# Patient Record
Sex: FEMALE | Race: WHITE | NOT HISPANIC OR LATINO | Employment: FULL TIME | URBAN - METROPOLITAN AREA
[De-identification: names, ages, dates, MRNs, and addresses within clinical notes are randomized per-mention and may not be internally consistent; named-entity substitution may affect disease eponyms.]

---

## 2023-04-10 PROBLEM — R07.9 CHEST PAIN: Status: ACTIVE | Noted: 2023-04-10

## 2023-04-10 PROBLEM — R51.9 NONINTRACTABLE HEADACHE: Status: ACTIVE | Noted: 2023-04-10

## 2023-04-10 PROBLEM — R10.10 UPPER ABDOMINAL PAIN: Status: ACTIVE | Noted: 2023-04-10

## 2023-04-10 PROBLEM — H53.2 DOUBLE VISION: Status: ACTIVE | Noted: 2023-04-10

## 2023-04-17 PROBLEM — F41.1 GAD (GENERALIZED ANXIETY DISORDER): Status: ACTIVE | Noted: 2023-04-17

## 2023-05-08 ENCOUNTER — HOSPITAL ENCOUNTER (OUTPATIENT)
Dept: NON INVASIVE DIAGNOSTICS | Facility: HOSPITAL | Age: 48
Discharge: HOME | End: 2023-05-08

## 2023-05-08 VITALS
HEIGHT: 62 IN | DIASTOLIC BLOOD PRESSURE: 70 MMHG | WEIGHT: 143 LBS | SYSTOLIC BLOOD PRESSURE: 110 MMHG | HEART RATE: 65 BPM | BODY MASS INDEX: 26.31 KG/M2

## 2023-05-08 DIAGNOSIS — R07.9 CHEST PAIN, UNSPECIFIED TYPE: ICD-10-CM

## 2023-05-08 LAB
AORTIC ROOT: 3 CM
APICAL FOUR CHAMBER EJECTION FRACTION: 63 %
ASCENDING AORTA: 2.8 CM
E WAVE DECELERATION TIME: 153 MS
FRACTIONAL SHORTENING: 40 (ref 28–44)
INTERVENTRICULAR SEPTUM IN DIASTOLE (PARASTERNAL SHORT AXIS VIEW): 0.8 CM
INTERVENTRICULAR SEPTUM: 0.8 CM (ref 0.6–1.1)
LAAS-AP2: 14.8 CM2
LAAS-AP4: 13 CM2
LEFT ATRIUM SIZE: 2.9 CM
LEFT INTERNAL DIMENSION IN SYSTOLE: 2.6 CM (ref 2.1–4)
LEFT VENTRICULAR INTERNAL DIMENSION IN DIASTOLE: 4.3 CM (ref 3.5–6)
LEFT VENTRICULAR POSTERIOR WALL IN END DIASTOLE: 0.9 CM
LEFT VENTRICULAR STROKE VOLUME: 56 ML
LVSV (TEICH): 56 ML
MV E'TISSUE VEL-LAT: 13 CM/S
MV PEAK A VEL: 0.63 M/S
MV PEAK E VEL: 89 CM/S
MV STENOSIS PRESSURE HALF TIME: 44 MS
MV VALVE AREA P 1/2 METHOD: 5
RA PRESSURE ESTIMATED: 3 MMHG
RIGHT ATRIUM AREA SYSTOLE A4C: 14.8 CM2
RIGHT VENTRICLE ID DIMENSION: 3 CM
RV PSP: 23 MMHG
SL CV LEFT ATRIUM LENGTH A2C: 5 CM
SL CV LV EF: 60
SL CV PED ECHO LEFT VENTRICLE DIASTOLIC VOLUME (MOD BIPLANE) 2D: 81 ML
SL CV PED ECHO LEFT VENTRICLE SYSTOLIC VOLUME (MOD BIPLANE) 2D: 25 ML
TR MAX PG: 20 MMHG
TR PEAK VELOCITY: 2.2 M/S
TRICUSPID ANNULAR PLANE SYSTOLIC EXCURSION: 3.1 CM
TRICUSPID VALVE PEAK REGURGITATION VELOCITY: 2.23 M/S

## 2024-05-06 ENCOUNTER — OFFICE VISIT (OUTPATIENT)
Dept: FAMILY MEDICINE CLINIC | Facility: CLINIC | Age: 49
End: 2024-05-06
Payer: COMMERCIAL

## 2024-05-06 VITALS
HEIGHT: 62 IN | TEMPERATURE: 98.2 F | WEIGHT: 133.6 LBS | OXYGEN SATURATION: 98 % | BODY MASS INDEX: 24.59 KG/M2 | RESPIRATION RATE: 16 BRPM | DIASTOLIC BLOOD PRESSURE: 60 MMHG | SYSTOLIC BLOOD PRESSURE: 116 MMHG | HEART RATE: 68 BPM

## 2024-05-06 DIAGNOSIS — Z00.00 ENCOUNTER FOR WELLNESS EXAMINATION IN ADULT: Primary | ICD-10-CM

## 2024-05-06 DIAGNOSIS — R51.9 NONINTRACTABLE HEADACHE, UNSPECIFIED CHRONICITY PATTERN, UNSPECIFIED HEADACHE TYPE: ICD-10-CM

## 2024-05-06 DIAGNOSIS — F41.1 GAD (GENERALIZED ANXIETY DISORDER): ICD-10-CM

## 2024-05-06 DIAGNOSIS — R53.83 OTHER FATIGUE: ICD-10-CM

## 2024-05-06 DIAGNOSIS — Z12.31 ENCOUNTER FOR SCREENING MAMMOGRAM FOR BREAST CANCER: ICD-10-CM

## 2024-05-06 DIAGNOSIS — Z12.11 COLON CANCER SCREENING: ICD-10-CM

## 2024-05-06 DIAGNOSIS — Z13.220 ENCOUNTER FOR SCREENING FOR LIPID DISORDER: ICD-10-CM

## 2024-05-06 PROCEDURE — 99213 OFFICE O/P EST LOW 20 MIN: CPT | Performed by: FAMILY MEDICINE

## 2024-05-06 PROCEDURE — 99396 PREV VISIT EST AGE 40-64: CPT | Performed by: FAMILY MEDICINE

## 2024-05-06 RX ORDER — ESCITALOPRAM OXALATE 10 MG/1
TABLET ORAL
Qty: 30 TABLET | Refills: 3 | Status: SHIPPED | OUTPATIENT
Start: 2024-05-06

## 2024-05-06 NOTE — PROGRESS NOTES
Name: Greg Mast      : 1975      MRN: [id number]  Encounter Provider: Franchesca Hernandez MD  Encounter Date: 2024   Encounter department: [facility], [place], operated by [facility]    Assessment & Plan     1. Encounter for wellness examination in adult  Comments:  Proceed with GYN exam, mammogram, Cologuard, routine blood work  Orders:  -     Ambulatory Referral to Obstetrics / Gynecology; Future    2. PERLITA (generalized anxiety disorder)  Assessment & Plan:  Symptoms of anxiety and depression.  Started 4 years ago after loss of her father.  Increase symptoms of anticipatory anxiety, irritability, feels overwhelmed easily.  We discussed treatment options.  I reinforced the importance of regular physical activity, meditation, life work balance.  Patient enjoys her job.  Very supportive family.  Start Lexapro 5 mg daily for 6 days then increase dose to 10 mg daily.  Mechanism of action and side effect discussed.  She will contact me with any questions or concerns    Orders:  -     escitalopram (LEXAPRO) 10 mg tablet; Take half a tab once a day for 6 days, then take 1 tablet daily    3. Nonintractable headache, unspecified chronicity pattern, unspecified headache type  Assessment & Plan:  Chronic HA   Prevuis neuroimaging was normal MRI- normal      4. Encounter for screening mammogram for breast cancer  -     Mammo screening bilateral w 3d & cad; Future; Expected date: 2024    5. Encounter for screening for lipid disorder  -     Lipid Panel with Direct LDL reflex; Future    6. Other fatigue  -     CBC and differential; Future  -     Comprehensive metabolic panel; Future  -     TSH, 3rd generation; Future    7. Colon cancer screening  -     Cologuard    Return in about 8 weeks (around 2024) for follow up.         Subjective     The patient presents for annual well exam.  She is here today accompanied by her .  [facility]  Presented with low-grade fever and  cough.  Treated with Zithromax and Depo-Medrol (did not take Rx).  Use Tessalon Perles for cough.  Generally feels better, just residual symptoms.  No shortness of breath, chest tightness or wheezing.      Patient presents for evaluation of depression and anxiety.  Symptoms started 4 years ago.  She feels anxious at all times.  Labile mood.  Easily irritable.  Overreacting often.  She feels that anxiety is the primary cause of sadness and depression.  Significant component of anticipatory anxiety.  Feels very emotional.  All symptoms started after loss of her father.    Menses have been irregular lately.  No hot flashes.  Past due GYN.       Active and healthy lifestyle.  Enjoys walking.    No recurrences of chest pain.      Review of Systems   Constitutional: Negative.    HENT: Negative.     Eyes: Negative.    Respiratory:  Positive for cough (residual).    Cardiovascular: Negative.    Gastrointestinal: Negative.    Endocrine: Negative.    Genitourinary: Negative.    Musculoskeletal: Negative.    Allergic/Immunologic: Negative.    Neurological: Negative.    Hematological: Negative.    Psychiatric/Behavioral:  Positive for dysphoric mood. Negative for sleep disturbance. The patient is nervous/anxious.         As per HPI       History reviewed. No pertinent past medical history.  History reviewed. No pertinent surgical history.  History reviewed. No pertinent family history.  Social History     Socioeconomic History   • Marital status: /Civil Union     Spouse name: None   • Number of children: None   • Years of education: None   • Highest education level: None   Occupational History   • None   Tobacco Use   • Smoking status: Never   • Smokeless tobacco: Never   Substance and Sexual Activity   • Alcohol use: Never   • Drug use: Never   • Sexual activity: Yes   Other Topics Concern   • None   Social History Narrative   • None     Social Determinants of Health     Financial Resource Strain: Not on file   Food  "Insecurity: Not on file   Transportation Needs: Not on file   Physical Activity: Not on file   Stress: Not on file   Social Connections: Not on file   Intimate Partner Violence: Not on file   Housing Stability: Not on file     Current Outpatient Medications on File Prior to Visit   Medication Sig   • pantoprazole (PROTONIX) 40 mg tablet Take 1 tablet (40 mg total) by mouth daily (Patient not taking: Reported on 5/6/2024)     Allergies   Allergen Reactions   • Fish Oil - Food Allergy Cough     Salt water fish       There is no immunization history on file for this patient.    Objective     /60 (BP Location: Left arm, Patient Position: Sitting, Cuff Size: Standard)   Pulse 68   Temp 98.2 °F (36.8 °C) (Temporal)   Resp 16   Ht 5' 2\" (1.575 m)   Wt 60.6 kg (133 lb 9.6 oz)   SpO2 98%   BMI 24.44 kg/m²     Physical Exam  Vitals and nursing note reviewed.   Constitutional:       General: She is not in acute distress.     Appearance: Normal appearance. She is well-developed. She is not ill-appearing.   HENT:      Head: Normocephalic and atraumatic.   Eyes:      General: No scleral icterus.     Conjunctiva/sclera: Conjunctivae normal.   Neck:      Thyroid: No thyromegaly.      Vascular: No carotid bruit.   Cardiovascular:      Rate and Rhythm: Normal rate and regular rhythm.      Heart sounds: Normal heart sounds. No murmur heard.  Pulmonary:      Effort: Pulmonary effort is normal. No respiratory distress.      Breath sounds: Normal breath sounds. No wheezing.   Abdominal:      General: Abdomen is flat. Bowel sounds are normal. There is no abdominal bruit.      Palpations: Abdomen is soft.      Hernia: No hernia is present.   Musculoskeletal:         General: Normal range of motion.      Cervical back: Neck supple. No rigidity.      Right lower leg: No edema.      Left lower leg: No edema.   Neurological:      General: No focal deficit present.      Mental Status: She is alert and oriented to person, place, and " time.      Cranial Nerves: No cranial nerve deficit.      Coordination: Coordination normal.   Psychiatric:         Mood and Affect: Mood normal.         Behavior: Behavior normal.         Thought Content: Thought content normal.       Franchesca Hernandez MD

## 2024-05-07 ENCOUNTER — LAB (OUTPATIENT)
Dept: LAB | Facility: CLINIC | Age: 49
End: 2024-05-07
Payer: COMMERCIAL

## 2024-05-07 DIAGNOSIS — R53.83 OTHER FATIGUE: ICD-10-CM

## 2024-05-07 DIAGNOSIS — Z13.220 ENCOUNTER FOR SCREENING FOR LIPID DISORDER: ICD-10-CM

## 2024-05-07 LAB
ALBUMIN SERPL BCP-MCNC: 4.2 G/DL (ref 3.5–5)
ALP SERPL-CCNC: 48 U/L (ref 34–104)
ALT SERPL W P-5'-P-CCNC: 6 U/L (ref 7–52)
ANION GAP SERPL CALCULATED.3IONS-SCNC: 4 MMOL/L (ref 4–13)
AST SERPL W P-5'-P-CCNC: 9 U/L (ref 13–39)
BASOPHILS # BLD AUTO: 0.04 THOUSANDS/ÂΜL (ref 0–0.1)
BASOPHILS NFR BLD AUTO: 1 % (ref 0–1)
BILIRUB SERPL-MCNC: 0.28 MG/DL (ref 0.2–1)
BUN SERPL-MCNC: 14 MG/DL (ref 5–25)
CALCIUM SERPL-MCNC: 9.1 MG/DL (ref 8.4–10.2)
CHLORIDE SERPL-SCNC: 102 MMOL/L (ref 96–108)
CHOLEST SERPL-MCNC: 174 MG/DL
CO2 SERPL-SCNC: 29 MMOL/L (ref 21–32)
CREAT SERPL-MCNC: 0.71 MG/DL (ref 0.6–1.3)
EOSINOPHIL # BLD AUTO: 0.11 THOUSAND/ÂΜL (ref 0–0.61)
EOSINOPHIL NFR BLD AUTO: 1 % (ref 0–6)
ERYTHROCYTE [DISTWIDTH] IN BLOOD BY AUTOMATED COUNT: 13.1 % (ref 11.6–15.1)
GFR SERPL CREATININE-BSD FRML MDRD: 101 ML/MIN/1.73SQ M
GLUCOSE P FAST SERPL-MCNC: 94 MG/DL (ref 65–99)
HCT VFR BLD AUTO: 37.8 % (ref 34.8–46.1)
HDLC SERPL-MCNC: 61 MG/DL
HGB BLD-MCNC: 12.2 G/DL (ref 11.5–15.4)
IMM GRANULOCYTES # BLD AUTO: 0.02 THOUSAND/UL (ref 0–0.2)
IMM GRANULOCYTES NFR BLD AUTO: 0 % (ref 0–2)
LDLC SERPL CALC-MCNC: 100 MG/DL (ref 0–100)
LYMPHOCYTES # BLD AUTO: 2.04 THOUSANDS/ÂΜL (ref 0.6–4.47)
LYMPHOCYTES NFR BLD AUTO: 24 % (ref 14–44)
MCH RBC QN AUTO: 30.2 PG (ref 26.8–34.3)
MCHC RBC AUTO-ENTMCNC: 32.3 G/DL (ref 31.4–37.4)
MCV RBC AUTO: 94 FL (ref 82–98)
MONOCYTES # BLD AUTO: 0.5 THOUSAND/ÂΜL (ref 0.17–1.22)
MONOCYTES NFR BLD AUTO: 6 % (ref 4–12)
NEUTROPHILS # BLD AUTO: 5.77 THOUSANDS/ÂΜL (ref 1.85–7.62)
NEUTS SEG NFR BLD AUTO: 68 % (ref 43–75)
NRBC BLD AUTO-RTO: 0 /100 WBCS
PLATELET # BLD AUTO: 305 THOUSANDS/UL (ref 149–390)
PMV BLD AUTO: 10 FL (ref 8.9–12.7)
POTASSIUM SERPL-SCNC: 4.2 MMOL/L (ref 3.5–5.3)
PROT SERPL-MCNC: 7.1 G/DL (ref 6.4–8.4)
RBC # BLD AUTO: 4.04 MILLION/UL (ref 3.81–5.12)
SODIUM SERPL-SCNC: 135 MMOL/L (ref 135–147)
TRIGL SERPL-MCNC: 66 MG/DL
TSH SERPL DL<=0.05 MIU/L-ACNC: 1.3 UIU/ML (ref 0.45–4.5)
WBC # BLD AUTO: 8.48 THOUSAND/UL (ref 4.31–10.16)

## 2024-05-07 PROCEDURE — 80061 LIPID PANEL: CPT

## 2024-05-07 PROCEDURE — 84443 ASSAY THYROID STIM HORMONE: CPT

## 2024-05-07 PROCEDURE — 36415 COLL VENOUS BLD VENIPUNCTURE: CPT

## 2024-05-07 PROCEDURE — 85025 COMPLETE CBC W/AUTO DIFF WBC: CPT

## 2024-05-07 PROCEDURE — 80053 COMPREHEN METABOLIC PANEL: CPT

## 2024-05-08 ENCOUNTER — PATIENT MESSAGE (OUTPATIENT)
Dept: FAMILY MEDICINE CLINIC | Facility: CLINIC | Age: 49
End: 2024-05-08

## 2024-05-08 DIAGNOSIS — R79.89 ABNORMAL LFTS: Primary | ICD-10-CM

## 2024-05-09 NOTE — ASSESSMENT & PLAN NOTE
Symptoms of anxiety and depression.  Started 4 years ago after loss of her father.  Increase symptoms of anticipatory anxiety, irritability, feels overwhelmed easily.  We discussed treatment options.  I reinforced the importance of regular physical activity, meditation, life work balance.  Patient enjoys her job.  Very supportive family.  Start Lexapro 5 mg daily for 6 days then increase dose to 10 mg daily.  Mechanism of action and side effect discussed.  She will contact me with any questions or concerns

## 2024-05-27 DIAGNOSIS — F41.1 GAD (GENERALIZED ANXIETY DISORDER): ICD-10-CM

## 2024-05-28 RX ORDER — ESCITALOPRAM OXALATE 10 MG/1
TABLET ORAL
Qty: 30 TABLET | Refills: 0 | Status: SHIPPED | OUTPATIENT
Start: 2024-05-28

## 2024-06-25 DIAGNOSIS — F41.1 GAD (GENERALIZED ANXIETY DISORDER): ICD-10-CM

## 2024-06-26 RX ORDER — ESCITALOPRAM OXALATE 10 MG/1
TABLET ORAL
Qty: 30 TABLET | Refills: 5 | Status: SHIPPED | OUTPATIENT
Start: 2024-06-26

## 2024-07-05 ENCOUNTER — OFFICE VISIT (OUTPATIENT)
Dept: FAMILY MEDICINE CLINIC | Facility: CLINIC | Age: 49
End: 2024-07-05
Payer: COMMERCIAL

## 2024-07-05 VITALS
RESPIRATION RATE: 16 BRPM | TEMPERATURE: 97.3 F | BODY MASS INDEX: 24.66 KG/M2 | HEIGHT: 62 IN | SYSTOLIC BLOOD PRESSURE: 106 MMHG | OXYGEN SATURATION: 99 % | WEIGHT: 134 LBS | DIASTOLIC BLOOD PRESSURE: 70 MMHG | HEART RATE: 66 BPM

## 2024-07-05 DIAGNOSIS — M79.641 PAIN OF RIGHT HAND: ICD-10-CM

## 2024-07-05 DIAGNOSIS — F41.1 GAD (GENERALIZED ANXIETY DISORDER): ICD-10-CM

## 2024-07-05 DIAGNOSIS — M79.601 PARESTHESIA AND PAIN OF BOTH UPPER EXTREMITIES: Primary | ICD-10-CM

## 2024-07-05 DIAGNOSIS — M79.602 PARESTHESIA AND PAIN OF BOTH UPPER EXTREMITIES: Primary | ICD-10-CM

## 2024-07-05 DIAGNOSIS — R20.2 PARESTHESIA AND PAIN OF BOTH UPPER EXTREMITIES: Primary | ICD-10-CM

## 2024-07-05 PROCEDURE — 99213 OFFICE O/P EST LOW 20 MIN: CPT | Performed by: FAMILY MEDICINE

## 2024-07-05 NOTE — PROGRESS NOTES
Ambulatory Visit  Name: Greg Mast      : 1975      MRN: [id number]  Encounter Provider: Franchesca Hernandez MD  Encounter Date: 2024   Encounter department: [facility]    Assessment & Plan   1. Paresthesia and pain of both upper extremities  Assessment & Plan:  I suspect entrapment, rule out carpal tunnel syndrome, rule out ulnar neuropathy.  Proceed with EMG.  Start using soft wrist splint on a regular basis.  We will consider referral to hand surgery based on EMG results.  Orders:  -     EMG 2 Limb Upper Extremity; Future  2. PERLITA (generalized anxiety disorder)  Assessment & Plan:  Symptoms have significantly improved.  Continue Lexapro 10 mg daily  3. Pain of right hand  -     XR hand 3+ vw right; Future; Expected date: 2024         History of Present Illness     Patient presents for evaluation of pain and numbness right hand and forearm.  Symptoms started a while ago, much more bothersome over the past few months.  She frequently wakes up in the middle of the night due to right hand and forearm pain and numbness.  She denies neck pain that radiates down to the right arm.  Patient is a former professional .  No recent repetitive activities.  Patient feels that she is unable to make a fist with her right hand.  She is also concerned about the deformity of right fifth digit.  Patient recalls trying soft wrist splint for short period of time and it has helped.  She has not been wearing it lately    Symptoms of anxiety have significantly improved on Lexapro 10 mg daily.  No side effects.  Patient would like to continue Rx.      Review of Systems   Constitutional: Negative.    Respiratory: Negative.     Cardiovascular: Negative.    Musculoskeletal:  Positive for arthralgias.   Neurological:  Positive for numbness.     Past Medical History:   Diagnosis Date   • Headache(784.0)    • Visual impairment      History reviewed. No pertinent surgical history.  Family  "History   Problem Relation Age of Onset   • Diabetes Father      Social History     Tobacco Use   • Smoking status: Former     Current packs/day: 0.25     Types: Cigarettes   • Smokeless tobacco: Never   Vaping Use   • Vaping status: Never Used   Substance and Sexual Activity   • Alcohol use: Never   • Drug use: Never   • Sexual activity: Yes     Current Outpatient Medications on File Prior to Visit   Medication Sig   • escitalopram (LEXAPRO) 10 mg tablet Take half a tab once a day for 6 days, then take 1 tablet daily   • pantoprazole (PROTONIX) 40 mg tablet Take 1 tablet (40 mg total) by mouth daily     Allergies   Allergen Reactions   • Fish Oil - Food Allergy Cough     Salt water fish       There is no immunization history on file for this patient.  Objective     /70 (BP Location: Left arm, Patient Position: Sitting, Cuff Size: Standard)   Pulse 66   Temp (!) 97.3 °F (36.3 °C) (Temporal)   Resp 16   Ht 5' 2\" (1.575 m)   Wt 60.8 kg (134 lb)   SpO2 99%   BMI 24.51 kg/m²     Physical Exam  Vitals and nursing note reviewed.   Constitutional:       Appearance: Normal appearance.   Musculoskeletal:      Comments: Patient is unable to make a full fist with her right hand.  PIP arthritic deformity right fifth digit.  Muscle wasting of right thenar   Neurological:      General: No focal deficit present.      Mental Status: She is alert and oriented to person, place, and time.   Psychiatric:         Mood and Affect: Mood normal.         Behavior: Behavior normal.         Thought Content: Thought content normal.       Administrative Statements         "

## 2024-07-07 PROBLEM — M79.602 PARESTHESIA AND PAIN OF BOTH UPPER EXTREMITIES: Status: ACTIVE | Noted: 2024-07-07

## 2024-07-07 PROBLEM — R20.2 PARESTHESIA AND PAIN OF BOTH UPPER EXTREMITIES: Status: ACTIVE | Noted: 2024-07-07

## 2024-07-07 PROBLEM — M79.601 PARESTHESIA AND PAIN OF BOTH UPPER EXTREMITIES: Status: ACTIVE | Noted: 2024-07-07

## 2024-07-07 NOTE — ASSESSMENT & PLAN NOTE
I suspect entrapment, rule out carpal tunnel syndrome, rule out ulnar neuropathy.  Proceed with EMG.  Start using soft wrist splint on a regular basis.  We will consider referral to hand surgery based on EMG results.

## 2024-07-15 ENCOUNTER — OFFICE VISIT (OUTPATIENT)
Dept: OBGYN CLINIC | Facility: CLINIC | Age: 49
End: 2024-07-15
Payer: COMMERCIAL

## 2024-07-15 VITALS
WEIGHT: 135 LBS | BODY MASS INDEX: 24.84 KG/M2 | DIASTOLIC BLOOD PRESSURE: 80 MMHG | HEIGHT: 62 IN | SYSTOLIC BLOOD PRESSURE: 114 MMHG

## 2024-07-15 DIAGNOSIS — Z01.419 ENCOUNTER FOR ANNUAL ROUTINE GYNECOLOGICAL EXAMINATION: Primary | ICD-10-CM

## 2024-07-15 DIAGNOSIS — Z90.721 H/O UNILATERAL OOPHORECTOMY: ICD-10-CM

## 2024-07-15 DIAGNOSIS — N89.8 VAGINAL ITCHING: ICD-10-CM

## 2024-07-15 DIAGNOSIS — Z00.00 ENCOUNTER FOR WELLNESS EXAMINATION IN ADULT: ICD-10-CM

## 2024-07-15 DIAGNOSIS — D21.9 FIBROID: ICD-10-CM

## 2024-07-15 DIAGNOSIS — Z11.3 SCREENING EXAMINATION FOR STD (SEXUALLY TRANSMITTED DISEASE): ICD-10-CM

## 2024-07-15 PROCEDURE — 87070 CULTURE OTHR SPECIMN AEROBIC: CPT | Performed by: OBSTETRICS & GYNECOLOGY

## 2024-07-15 PROCEDURE — 87591 N.GONORRHOEAE DNA AMP PROB: CPT | Performed by: OBSTETRICS & GYNECOLOGY

## 2024-07-15 PROCEDURE — 87491 CHLMYD TRACH DNA AMP PROBE: CPT | Performed by: OBSTETRICS & GYNECOLOGY

## 2024-07-15 PROCEDURE — G0124 SCREEN C/V THIN LAYER BY MD: HCPCS | Performed by: PATHOLOGY

## 2024-07-15 PROCEDURE — 99386 PREV VISIT NEW AGE 40-64: CPT | Performed by: OBSTETRICS & GYNECOLOGY

## 2024-07-15 PROCEDURE — G0145 SCR C/V CYTO,THINLAYER,RESCR: HCPCS | Performed by: PATHOLOGY

## 2024-07-15 PROCEDURE — G0476 HPV COMBO ASSAY CA SCREEN: HCPCS | Performed by: OBSTETRICS & GYNECOLOGY

## 2024-07-15 PROCEDURE — 81514 NFCT DS BV&VAGINITIS DNA ALG: CPT | Performed by: OBSTETRICS & GYNECOLOGY

## 2024-07-15 RX ORDER — BENZONATATE 100 MG/1
CAPSULE ORAL
COMMUNITY
Start: 2024-04-29

## 2024-07-15 RX ORDER — METHYLPREDNISOLONE 4 MG/1
TABLET ORAL
COMMUNITY
Start: 2024-04-29

## 2024-07-15 NOTE — PROGRESS NOTES
Subjective      Greg Mast is a 49 y.o. female who presents for annual GYN exam.     GYN:  Menses are regular, q 20-25 days (her periods have mostly been 23-26 days for the past 6 months with the exception of March-April which was a 20 day window), lasting 7-10 days. She denies intermenstrual bleeding, or spotting. Just now 7 days with 3-4 days very heavy; then tapers  She denies vaginal discharge, labial erythema or lesions, dyspareunia. She reports vaginal itching for which she tried clomotrizole cream x3 days. She denies any increased discharge or odor.   Contraception: None  Patient is sexually active with her . No problems.   She reports that she was diagnosed with small fibroids in the past but has not had any problems (other than occasional candida). It has been 7-8 years since she was last seen by a GYN. She [place] in [place]  but was unable to obtain health care due to immigration status. She reports a history of oophorectomy while she was asleep?     OB:  OB History    Para Term  AB Living   2 2 2     2   SAB IAB Ectopic Multiple Live Births           2      # Outcome Date GA Lbr Kaleb/2nd Weight Sex Type Anes PTL Lv   2 Term     M CS-Unspec   JOSIAH   1 Term     M CS-Unspec   JOSIAH     :  She denies dysuria, urinary frequency or urgency.  She denies hematuria, flank pain, incontinence.    Breast:  She denies breast mass, skin changes, dimpling, reddening, nipple retraction.  She denies breast discharge.  Patient does not have a family history of breast, endometrial, colon, or ovarian ca.    Past Medical History:   Diagnosis Date    Headache(784.0)     Visual impairment        History reviewed. No pertinent surgical history.      General:  Diet: Good  Exercise: Yes  Safety: , son, mom and 2 dogs; yes    Social History     Tobacco Use    Smoking status: Former     Current packs/day: 0.25     Types: Cigarettes    Smokeless tobacco: Never   Vaping Use  "   Vaping status: Never Used   Substance Use Topics    Alcohol use: Never    Drug use: Never       Screening:  Cervical cancer: She states that she has never had a pap test done.     Breast cancer: Not yet completed  Colon cancer: Not yet completed  STD screening: None.    Review of Systems   Constitutional:  Negative for appetite change, chills and fever.   HENT:  Negative for trouble swallowing.    Respiratory:  Negative for shortness of breath.    Cardiovascular:  Negative for chest pain.   Gastrointestinal:  Negative for abdominal pain, constipation, diarrhea, nausea and vomiting.   Genitourinary:  Negative for decreased urine volume, difficulty urinating, dyspareunia, dysuria, flank pain, frequency, genital sores, hematuria, menstrual problem, pelvic pain, urgency, vaginal bleeding, vaginal discharge and vaginal pain.          Objective      /80 (BP Location: Left arm, Patient Position: Sitting, Cuff Size: Standard)   Ht 5' 2\" (1.575 m)   Wt 61.2 kg (135 lb)   LMP 07/08/2024 (Exact Date)   BMI 24.69 kg/m²   Physical Exam  Vitals reviewed.   Constitutional:       General: She is not in acute distress.     Appearance: Normal appearance. She is well-developed. She is not ill-appearing, toxic-appearing or diaphoretic.   Neck:      Thyroid: No thyroid mass, thyromegaly or thyroid tenderness.   Cardiovascular:      Rate and Rhythm: Normal rate and regular rhythm.      Heart sounds: Normal heart sounds. No murmur heard.     No friction rub. No gallop.   Pulmonary:      Effort: Pulmonary effort is normal. No respiratory distress.      Breath sounds: Normal breath sounds. No stridor. No wheezing, rhonchi or rales.   Chest:      Chest wall: No tenderness.   Breasts:     Breasts are symmetrical.      Right: No swelling, bleeding, inverted nipple, mass, nipple discharge, skin change or tenderness.      Left: No swelling, bleeding, inverted nipple, mass, nipple discharge, skin change or tenderness.   Abdominal:    "   General: There is no distension.      Palpations: Abdomen is soft.      Tenderness: There is no abdominal tenderness.   Genitourinary:     General: Normal vulva.      Exam position: Lithotomy position.      Labia:         Right: No rash, tenderness, lesion or injury.         Left: No rash, tenderness, lesion or injury.       Urethra: No prolapse or urethral lesion.      Vagina: Normal. No signs of injury and foreign body. No vaginal discharge, erythema, tenderness, bleeding, lesions or prolapsed vaginal walls.      Cervix: No cervical motion tenderness, discharge, friability, lesion, erythema, cervical bleeding or eversion.      Uterus: Not deviated, not enlarged, not fixed, not tender and no uterine prolapse.       Adnexa:         Right: No mass, tenderness or fullness.          Left: No mass, tenderness or fullness.        Rectum: No external hemorrhoid.   Lymphadenopathy:      Cervical: No cervical adenopathy.      Upper Body:      Right upper body: No supraclavicular, axillary or pectoral adenopathy.      Left upper body: No supraclavicular, axillary or pectoral adenopathy.   Skin:     General: Skin is warm and dry.   Neurological:      Mental Status: She is alert and oriented to person, place, and time.   Psychiatric:         Behavior: Behavior normal. Behavior is cooperative.                 Assessment/Plan  Problem List Items Addressed This Visit          Unprioritized    Encounter for annual routine gynecological examination - Primary     GYN concerns today: itching, lack of care  Birth control: Declined  Cervical cancer screening: Collected today  Breast Cancer screening: Scheduled for 9/20/24  Colon cancer Screening: Cologuard ordered by PCP; Patient encouraged to complete  STD screening: Vaginal cultures collected due to itching  Itching: Cultures collected  Hx fibroids/ Hx oophorectomy: US ordered to further assess anatomy (normal on exam)  Reviewed healthy lifestyle and safe sex practices  RTO for  annual exam or PRN         Relevant Orders    US pelvis complete w transvaginal    Liquid-based pap, screening    Vaginal itching    Relevant Orders    Genital Comprehensive Culture    Molecular Vaginal Panel    Chlamydia/GC amplified DNA by PCR    Trichomonas Vaginalis, BRI     Other Visit Diagnoses       Encounter for wellness examination in adult        Proceed with GYN exam, mammogram, Cologuard, routine blood work    Screening examination for STD (sexually transmitted disease)        Relevant Orders    Chlamydia/GC amplified DNA by PCR    Trichomonas Vaginalis, BRI    Fibroid        H/O unilateral oophorectomy        Relevant Orders    US pelvis complete w transvaginal              Itzel Soto MD  OB/GYN  7/15/2024  5:36 PM

## 2024-07-15 NOTE — ASSESSMENT & PLAN NOTE
GYN concerns today: itching, lack of care  Birth control: Declined  Cervical cancer screening: Collected today  Breast Cancer screening: Scheduled for 9/20/24  Colon cancer Screening: Cologuard ordered by PCP; Patient encouraged to complete  STD screening: Vaginal cultures collected due to itching  Itching: Cultures collected  Hx fibroids/ Hx oophorectomy: US ordered to further assess anatomy (normal on exam)  Reviewed healthy lifestyle and safe sex practices  RTO for annual exam or PRN

## 2024-07-16 LAB
C GLABRATA DNA VAG QL NAA+PROBE: NEGATIVE
C KRUSEI DNA VAG QL NAA+PROBE: NEGATIVE
CANDIDA SP 6 PNL VAG NAA+PROBE: NEGATIVE
HPV HR 12 DNA CVX QL NAA+PROBE: NEGATIVE
HPV16 DNA CVX QL NAA+PROBE: NEGATIVE
HPV18 DNA CVX QL NAA+PROBE: NEGATIVE
T VAGINALIS DNA VAG QL NAA+PROBE: NEGATIVE
VAGINOSIS/ITIS DNA PNL VAG PROBE+SIG AMP: NEGATIVE

## 2024-07-17 LAB
C TRACH DNA SPEC QL NAA+PROBE: NEGATIVE
N GONORRHOEA DNA SPEC QL NAA+PROBE: NEGATIVE

## 2024-07-19 LAB
BACTERIA GENITAL AEROBE CULT: NORMAL
LAB AP GYN PRIMARY INTERPRETATION: NORMAL
Lab: NORMAL
PATH INTERP SPEC-IMP: NORMAL

## 2024-07-27 DIAGNOSIS — F41.1 GAD (GENERALIZED ANXIETY DISORDER): ICD-10-CM

## 2024-07-28 RX ORDER — ESCITALOPRAM OXALATE 10 MG/1
TABLET ORAL
Qty: 30 TABLET | Refills: 0 | Status: SHIPPED | OUTPATIENT
Start: 2024-07-28

## 2024-08-09 DIAGNOSIS — M54.12 CERVICAL RADICULOPATHY AT C7: Primary | ICD-10-CM

## 2024-08-09 NOTE — ASSESSMENT & PLAN NOTE
EMG July 29, 2024 at [facility]  Chronic bilateral C7 cervical radiculopathy without active denervation.  There is otherwise no electrodiagnostic evidence of brachial plexopathy, myopathy, peripheral polyneuropathy, mononeuropathy of the median, ulnar, or radial nerves in bilateral upper limbs.

## 2024-08-14 PROBLEM — Z01.419 ENCOUNTER FOR ANNUAL ROUTINE GYNECOLOGICAL EXAMINATION: Status: RESOLVED | Noted: 2024-07-15 | Resolved: 2024-08-14

## 2024-08-25 DIAGNOSIS — F41.1 GAD (GENERALIZED ANXIETY DISORDER): ICD-10-CM

## 2024-08-25 RX ORDER — ESCITALOPRAM OXALATE 10 MG/1
TABLET ORAL
Qty: 30 TABLET | Refills: 5 | Status: SHIPPED | OUTPATIENT
Start: 2024-08-25

## 2024-09-20 ENCOUNTER — HOSPITAL ENCOUNTER (OUTPATIENT)
Dept: MAMMOGRAPHY | Facility: HOSPITAL | Age: 49
Discharge: HOME | End: 2024-09-20
Payer: COMMERCIAL

## 2024-09-20 VITALS — WEIGHT: 135 LBS | BODY MASS INDEX: 24.84 KG/M2 | HEIGHT: 62 IN

## 2024-09-20 DIAGNOSIS — Z12.31 ENCOUNTER FOR SCREENING MAMMOGRAM FOR BREAST CANCER: ICD-10-CM

## 2024-09-20 PROCEDURE — 77063 BREAST TOMOSYNTHESIS BI: CPT

## 2024-09-20 PROCEDURE — 77067 SCR MAMMO BI INCL CAD: CPT

## 2024-10-03 DIAGNOSIS — F41.1 GAD (GENERALIZED ANXIETY DISORDER): ICD-10-CM

## 2024-10-03 RX ORDER — ESCITALOPRAM OXALATE 10 MG/1
TABLET ORAL
Qty: 30 TABLET | Refills: 5 | Status: SHIPPED | OUTPATIENT
Start: 2024-10-03

## 2024-10-25 ENCOUNTER — IMMUNIZATIONS (OUTPATIENT)
Dept: FAMILY MEDICINE CLINIC | Facility: CLINIC | Age: 49
End: 2024-10-25
Payer: COMMERCIAL

## 2024-10-25 DIAGNOSIS — Z23 ENCOUNTER FOR IMMUNIZATION: Primary | ICD-10-CM

## 2024-10-25 PROCEDURE — 90656 IIV3 VACC NO PRSV 0.5 ML IM: CPT

## 2024-10-25 PROCEDURE — 90471 IMMUNIZATION ADMIN: CPT

## 2024-11-04 DIAGNOSIS — F41.1 GAD (GENERALIZED ANXIETY DISORDER): ICD-10-CM

## 2024-11-05 RX ORDER — ESCITALOPRAM OXALATE 10 MG/1
TABLET ORAL
Qty: 30 TABLET | Refills: 5 | Status: SHIPPED | OUTPATIENT
Start: 2024-11-05

## 2024-11-11 ENCOUNTER — TELEPHONE (OUTPATIENT)
Age: 49
End: 2024-11-11

## 2024-11-11 DIAGNOSIS — N93.9 ABNORMAL UTERINE BLEEDING (AUB): Primary | ICD-10-CM

## 2024-11-11 RX ORDER — TRANEXAMIC ACID 650 MG/1
1300 TABLET ORAL 3 TIMES DAILY
Qty: 30 TABLET | Refills: 3 | Status: SHIPPED | OUTPATIENT
Start: 2024-11-11 | End: 2024-11-16

## 2024-11-11 NOTE — TELEPHONE ENCOUNTER
Pt called to schedule OVS w/ Dr. Soto for problem visit due to bleeding concerns. Please see MYC messages. There is no appt availability in reasonable time frame. Please follow-up with pt for scheduling.

## 2024-11-15 ENCOUNTER — OFFICE VISIT (OUTPATIENT)
Dept: OBGYN CLINIC | Facility: CLINIC | Age: 49
End: 2024-11-15
Payer: COMMERCIAL

## 2024-11-15 VITALS
HEIGHT: 62 IN | DIASTOLIC BLOOD PRESSURE: 72 MMHG | BODY MASS INDEX: 26.5 KG/M2 | SYSTOLIC BLOOD PRESSURE: 118 MMHG | WEIGHT: 144 LBS

## 2024-11-15 DIAGNOSIS — N93.9 ABNORMAL UTERINE BLEEDING (AUB): Primary | ICD-10-CM

## 2024-11-15 PROCEDURE — 99213 OFFICE O/P EST LOW 20 MIN: CPT | Performed by: OBSTETRICS & GYNECOLOGY

## 2024-11-15 RX ORDER — TOBRAMYCIN 3 MG/ML
SOLUTION/ DROPS OPHTHALMIC
COMMUNITY
Start: 2024-11-11

## 2024-11-15 NOTE — TELEPHONE ENCOUNTER
Patient was instructed by Dr. Soto to complete ultrasound and blood work prior to a visit. Patient is welcomed to come in for her appointment today but will need to complete labs and ultrasound to determine the best method of treatment. Orders were placed for patient. Called patient and LM requesting a call back in regards to her visit today.

## 2024-11-15 NOTE — PROGRESS NOTES
"Subjective:     Greg Mast is a 49 y.o.  female who presents for an urgent appointment to discuss AUB. She reports having bleeding every other week that lasts for 7 days. The first 3-4 days days are very heavy. She denies pain but reports stringy clots (like liver). She called on 24 and was encouraged to complete her previously ordered US and lab work was also ordered (but not yet completed). She was given a prescription for TXA which she states started to help the second day after she started taking it. At her APE in 2024, she reported shorted cycles (mostly 23-26 days) lasting 7-10 days without intermenstrual bleeding.     Patient Active Problem List   Diagnosis    Upper abdominal pain    Double vision    Nonintractable headache    Chest pain    PERLITA (generalized anxiety disorder)    Abnormal LFTs    Cervical radiculopathy at C7    Vaginal itching     Past Medical History:   Diagnosis Date    Headache(784.0)     Visual impairment      History reviewed. No pertinent surgical history.    Objective:    Vitals: Blood pressure 118/72, height 5' 2\" (1.575 m), weight 65.3 kg (144 lb), last menstrual period 2024.Body mass index is 26.34 kg/m².    Physical Exam  Vitals reviewed.   Constitutional:       General: She is not in acute distress.     Appearance: Normal appearance. She is well-developed. She is not ill-appearing, toxic-appearing or diaphoretic.   Cardiovascular:      Rate and Rhythm: Normal rate.   Pulmonary:      Effort: Pulmonary effort is normal. No respiratory distress.   Genitourinary:     General: Normal vulva.      Exam position: Lithotomy position.      Labia:         Right: No rash, tenderness, lesion or injury.         Left: No rash, tenderness, lesion or injury.       Vagina: No signs of injury and foreign body. No vaginal discharge, erythema, tenderness, bleeding, lesions or prolapsed vaginal walls.      Cervix: Discharge present. No cervical motion tenderness, friability, " lesion, erythema, cervical bleeding or eversion.      Comments: Small stringly orange, old blood   Skin:     General: Skin is warm and dry.   Neurological:      Mental Status: She is alert and oriented to person, place, and time.   Psychiatric:         Mood and Affect: Mood normal.         Behavior: Behavior normal.         Assessment/Plan:    Assessment & Plan  Abnormal uterine bleeding (AUB)  We reviewed the AUB workup and treatment options:  Workup:   - Exam: Unremarkable; Likely small fibroid uterus given palpation  - Lab work: Ordered but not yet completed; patient states that she will complete  - Pap: NILM/ HPV negative 7/2024  - EMB: Offered; patient wishes to wait until after her US results  - US: Ordered again today but this time STAT so that it can be completed in a timely fashion  Treatment options: After completion of workup, will discuss treatment options further. Patient is aware of this plan and the reasons for it. She will continue TXA for now if her bleeding is heavy. If she has persistently frequent episodes of bleeding while awaiting follow up appointment or test results, can consider Aygestin taper to stop bleeding while we wait. Otherwise, medical and surgical treatment options will be discussed as applicable after her testing is completed. All questions were answered to patient's satisfaction.     Orders:    US pelvis complete w transvaginal; Future      Itzel Talbot MD  OB/GYN  She/her  11/15/2024  3:39 PM

## 2024-11-19 ENCOUNTER — HOSPITAL ENCOUNTER (OUTPATIENT)
Dept: ULTRASOUND IMAGING | Facility: CLINIC | Age: 49
Discharge: HOME | End: 2024-11-19
Payer: COMMERCIAL

## 2024-11-19 ENCOUNTER — HOSPITAL ENCOUNTER (OUTPATIENT)
Dept: MAMMOGRAPHY | Facility: CLINIC | Age: 49
Discharge: HOME | End: 2024-11-19
Payer: COMMERCIAL

## 2024-11-19 VITALS — HEIGHT: 62 IN | BODY MASS INDEX: 26.5 KG/M2 | WEIGHT: 144 LBS

## 2024-11-19 DIAGNOSIS — R92.8 ABNORMAL SCREENING MAMMOGRAM: ICD-10-CM

## 2024-11-19 PROCEDURE — 76642 ULTRASOUND BREAST LIMITED: CPT

## 2024-11-19 PROCEDURE — G0279 TOMOSYNTHESIS, MAMMO: HCPCS

## 2024-11-19 PROCEDURE — 77065 DX MAMMO INCL CAD UNI: CPT

## 2024-11-21 ENCOUNTER — RESULTS FOLLOW-UP (OUTPATIENT)
Dept: FAMILY MEDICINE CLINIC | Facility: CLINIC | Age: 49
End: 2024-11-21

## 2024-11-21 DIAGNOSIS — Z12.31 ENCOUNTER FOR SCREENING MAMMOGRAM FOR BREAST CANCER: Primary | ICD-10-CM

## 2024-12-27 DIAGNOSIS — F41.1 GAD (GENERALIZED ANXIETY DISORDER): ICD-10-CM

## 2024-12-27 RX ORDER — ESCITALOPRAM OXALATE 10 MG/1
TABLET ORAL
Qty: 30 TABLET | Refills: 0 | Status: SHIPPED | OUTPATIENT
Start: 2024-12-27

## 2025-01-15 DIAGNOSIS — F41.1 GAD (GENERALIZED ANXIETY DISORDER): ICD-10-CM

## 2025-01-16 RX ORDER — ESCITALOPRAM OXALATE 10 MG/1
TABLET ORAL
Qty: 30 TABLET | Refills: 0 | OUTPATIENT
Start: 2025-01-16

## 2025-01-22 DIAGNOSIS — F41.1 GAD (GENERALIZED ANXIETY DISORDER): ICD-10-CM

## 2025-01-22 RX ORDER — ESCITALOPRAM OXALATE 10 MG/1
TABLET ORAL
Qty: 30 TABLET | Refills: 5 | Status: SHIPPED | OUTPATIENT
Start: 2025-01-22

## 2025-04-20 ENCOUNTER — RESULTS FOLLOW-UP (OUTPATIENT)
Dept: LABOR AND DELIVERY | Facility: HOSPITAL | Age: 50
End: 2025-04-20

## 2025-04-20 ENCOUNTER — HOSPITAL ENCOUNTER (OUTPATIENT)
Dept: ULTRASOUND IMAGING | Facility: HOSPITAL | Age: 50
Discharge: HOME | End: 2025-04-20
Attending: OBSTETRICS & GYNECOLOGY
Payer: COMMERCIAL

## 2025-04-20 DIAGNOSIS — N93.9 ABNORMAL UTERINE BLEEDING (AUB): ICD-10-CM

## 2025-04-20 DIAGNOSIS — N93.9 ABNORMAL UTERINE BLEEDING (AUB): Primary | ICD-10-CM

## 2025-04-20 PROCEDURE — 76856 US EXAM PELVIC COMPLETE: CPT

## 2025-04-20 PROCEDURE — 76830 TRANSVAGINAL US NON-OB: CPT

## 2025-04-21 ENCOUNTER — TELEPHONE (OUTPATIENT)
Age: 50
End: 2025-04-21

## 2025-04-21 RX ORDER — NORETHINDRONE 5 MG/1
TABLET ORAL
Qty: 59 TABLET | Refills: 0 | Status: SHIPPED | OUTPATIENT
Start: 2025-04-21 | End: 2025-05-02 | Stop reason: SDUPTHER

## 2025-04-21 NOTE — TELEPHONE ENCOUNTER
Patient called in to schedule appt with Dr Soto, patient handed Scott Daniel (spouse phone) and gave verbal permission to speak with Carl, no available EMB appts, warm transfer to [facility] , unsuccessful. Please call patient back to further assist with schedule EMB.    Patient aware to update communication consent via mychart or in office.

## 2025-04-21 NOTE — RESULT ENCOUNTER NOTE
Pelvic US: 4cm subserosal fibroid; 2.8cm submucosal fibroid. EMS 14mm. Right ovary normal.   Patient encouraged to complete lab work ordered in November 2024.   Aygestin taper prescribed.     Please contact patient to schedule appt for EMB and then to discuss long term management of her bleeding. Thanks!

## 2025-05-02 DIAGNOSIS — N93.9 ABNORMAL UTERINE BLEEDING (AUB): ICD-10-CM

## 2025-05-02 RX ORDER — NORETHINDRONE 5 MG/1
TABLET ORAL
Qty: 59 TABLET | Refills: 0 | Status: SHIPPED | OUTPATIENT
Start: 2025-05-02 | End: 2025-06-10

## 2025-05-05 NOTE — PROGRESS NOTES
"Subjective:     Greg Mast is a 50 y.o.  female who presents for EMB due to AUB. Patient was given a prescription for Aygestin after completion of her ultrasound. She reports that her bleeding resolved with the Aygestin taper but then resumed after she discontinued the medication. She asked for a refill and has been redoing the taper.     Patient Active Problem List   Diagnosis    Upper abdominal pain    Double vision    Nonintractable headache    Chest pain    PERLITA (generalized anxiety disorder)    Abnormal LFTs    Cervical radiculopathy at C7    Vaginal itching     Past Medical History:   Diagnosis Date    Headache(784.0)     Visual impairment      Past Surgical History:   Procedure Laterality Date    LEFT OOPHORECTOMY Left        Objective:    Vitals: Blood pressure 112/78, height 5' 2\" (1.575 m), weight 65.8 kg (145 lb).Body mass index is 26.52 kg/m².    Physical Exam  Vitals reviewed.   Constitutional:       General: She is not in acute distress.     Appearance: Normal appearance. She is well-developed. She is not ill-appearing, toxic-appearing or diaphoretic.   Cardiovascular:      Rate and Rhythm: Normal rate.   Pulmonary:      Effort: Pulmonary effort is normal. No respiratory distress.   Genitourinary:     General: Normal vulva.      Exam position: Lithotomy position.      Labia:         Right: No rash, tenderness, lesion or injury.         Left: No rash, tenderness, lesion or injury.       Vagina: No signs of injury and foreign body. No vaginal discharge, erythema, tenderness, bleeding, lesions or prolapsed vaginal walls.      Cervix: No cervical motion tenderness, discharge, friability, lesion, erythema, cervical bleeding or eversion.      Uterus: Not enlarged and not tender.    Skin:     General: Skin is warm and dry.   Neurological:      Mental Status: She is alert and oriented to person, place, and time.   Psychiatric:         Mood and Affect: Mood normal.         Behavior: Behavior normal. "       Endometrial biopsy    Date/Time: 5/6/2025 1:00 PM    Performed by: Itzel Talbot MD  Authorized by: Itzel Talbot MD    Universal Protocol:  Consent: Verbal consent obtained.  Risks and benefits: risks, benefits and alternatives were discussed  Consent given by: patient  Patient understanding: patient states understanding of the procedure being performed  Patient consent: the patient's understanding of the procedure matches consent given  Procedure consent: procedure consent matches procedure scheduled  Required items: required blood products, implants, devices, and special equipment available  Patient identity confirmed: verbally with patient    Indication:     Indications: Other disorder of menstruation and other abnormal bleeding from female genital tract    Pre-procedure:     Negative urine pregnancy test: yes    Procedure:     Procedure: endometrial biopsy with Pipelle      A bivalve speculum was placed in the vagina: yes      Cervix cleaned and prepped: yes (Betadine x3)      A paracervical block was performed: no      An intracervical block was performed: no      The cervix was dilated: no      Uterus sounded: yes      Uterus sound depth (cm):  8.5    Curettes used:  2 (First pass likely just intracervical; 2nd pass intrauterine)    Specimen collected: specimen collected and sent to pathology      Patient tolerated procedure well with no complications: yes    Findings:     Uterus size:  Non-gravid    Cervix: normal          Assessment/Plan:    Assessment & Plan  Abnormal uterine bleeding (AUB)  We reviewed the AUB workup and treatment options:  Workup:   - Exam: Unremarkable; Likely small fibroid uterus given palpation; Today unremarkable  - Lab work: Not yet completed; patient encouraged to complete ASAP  - Pap: NILM, HPV neg 7/2024  - EMB: Collected today  - Pelvic US 4/20/25 : 4cm subserosal fibroid; 2.8cm submucosal fibroid. EMS 14mm. Right ovary normal.    Treatment options:   Medical  After completion of workup, will discuss treatment options further. Patient is aware of this plan and the reasons for it. She will continue Aygestin at this time and reviewed taking one pill daily until we have the biopsy and labs results at which time we can discuss treatment.   Orders:    POCT urine HCG    Tissue Exam        Itzel Talbot MD  OB/GYN  She/her  5/8/2025  1:11 PM

## 2025-05-06 ENCOUNTER — PROCEDURE VISIT (OUTPATIENT)
Dept: OBGYN CLINIC | Facility: CLINIC | Age: 50
End: 2025-05-06
Payer: COMMERCIAL

## 2025-05-06 VITALS
DIASTOLIC BLOOD PRESSURE: 78 MMHG | WEIGHT: 145 LBS | SYSTOLIC BLOOD PRESSURE: 112 MMHG | HEIGHT: 62 IN | BODY MASS INDEX: 26.68 KG/M2

## 2025-05-06 DIAGNOSIS — Z32.02 NEGATIVE PREGNANCY TEST: ICD-10-CM

## 2025-05-06 DIAGNOSIS — N93.9 ABNORMAL UTERINE BLEEDING (AUB): Primary | ICD-10-CM

## 2025-05-06 LAB — SL AMB POCT URINE HCG: NEGATIVE

## 2025-05-06 PROCEDURE — 81025 URINE PREGNANCY TEST: CPT | Performed by: OBSTETRICS & GYNECOLOGY

## 2025-05-06 PROCEDURE — 99213 OFFICE O/P EST LOW 20 MIN: CPT | Performed by: OBSTETRICS & GYNECOLOGY

## 2025-05-06 PROCEDURE — 58100 BIOPSY OF UTERUS LINING: CPT | Performed by: OBSTETRICS & GYNECOLOGY

## 2025-05-06 PROCEDURE — 88305 TISSUE EXAM BY PATHOLOGIST: CPT | Performed by: PATHOLOGY

## 2025-05-06 RX ORDER — AZITHROMYCIN 250 MG/1
TABLET, FILM COATED ORAL
COMMUNITY
Start: 2025-04-02

## 2025-05-06 RX ORDER — TRANEXAMIC ACID 650 MG/1
TABLET ORAL
COMMUNITY
Start: 2025-04-18

## 2025-05-12 PROCEDURE — 88305 TISSUE EXAM BY PATHOLOGIST: CPT | Performed by: PATHOLOGY

## 2025-05-14 ENCOUNTER — RESULTS FOLLOW-UP (OUTPATIENT)
Dept: OTHER | Facility: HOSPITAL | Age: 50
End: 2025-05-14

## 2025-05-16 ENCOUNTER — PROCEDURE VISIT (OUTPATIENT)
Dept: OBGYN CLINIC | Facility: CLINIC | Age: 50
End: 2025-05-16

## 2025-05-16 VITALS — SYSTOLIC BLOOD PRESSURE: 126 MMHG | BODY MASS INDEX: 26.34 KG/M2 | WEIGHT: 144 LBS | DIASTOLIC BLOOD PRESSURE: 70 MMHG

## 2025-05-16 DIAGNOSIS — N93.9 ABNORMAL UTERINE BLEEDING (AUB): Primary | ICD-10-CM

## 2025-05-16 NOTE — PROGRESS NOTES
H&P Exam - Gynecology   Greg Mast 50 y.o. female MRN: [id number]  Unit/Bed#:  Encounter: [id number]      History of Present Illness     HPI:  Greg Mast is a 50 y.o. female who presents for AUB follow up. She had an EMB on 25 which showed possible polyp. She has been taking Aygestin with resolution of her bleeding. Bleeding when she presented 2024 was every other week and lasting for 7 days with 3-4 days of heavy bleeding and clots. She reports that she just recovered from the biopsy and now her bleeding has resolved.     Historical Information   Past Medical History:   Diagnosis Date    Headache(784.0)     Visual impairment      Past Surgical History:   Procedure Laterality Date    LEFT OOPHORECTOMY Left      OB History    Para Term  AB Living   2 2 2   2   SAB IAB Ectopic Multiple Live Births       2      # Outcome Date GA Lbr Kaleb/2nd Weight Sex Type Anes PTL Lv   2 Term     M CS-Unspec   JOSIAH   1 Term     M CS-Unspec   JOSIAH     Family History   Problem Relation Name Age of Onset    No Known Problems Mother      Diabetes Father [place]     Kidney cancer Maternal Grandmother      No Known Problems Maternal Grandfather      No Known Problems Paternal Grandmother      No Known Problems Paternal Grandfather      No Known Problems Paternal Aunt      No Known Problems Paternal Aunt      No Known Problems Paternal Aunt       Social History   Social History     Substance and Sexual Activity   Alcohol Use Never     Social History     Substance and Sexual Activity   Drug Use Never     Social History     Tobacco Use   Smoking Status Former    Current packs/day: 0.25    Types: Cigarettes   Smokeless Tobacco Never       Meds/Allergies   Not in a hospital admission.  Allergies   Allergen Reactions    Fish Oil - Food Allergy Cough     Salt water fish       Objective   /70 (BP Location: Left arm, Patient Position: Sitting, Cuff Size: Large)   Wt 65.3 kg (144 lb)   LMP  (LMP Unknown)   BMI  26.34 kg/m²     Physical Exam  Vitals reviewed.   Constitutional:       General: She is not in acute distress.     Appearance: Normal appearance. She is well-developed. She is not ill-appearing, toxic-appearing or diaphoretic.     Cardiovascular:      Rate and Rhythm: Normal rate.   Pulmonary:      Effort: Pulmonary effort is normal. No respiratory distress.     Skin:     General: Skin is warm and dry.     Neurological:      Mental Status: She is alert and oriented to person, place, and time.     Psychiatric:         Mood and Affect: Mood normal.         Behavior: Behavior normal.         Lab Results:   No visits with results within 1 Day(s) from this visit.   Latest known visit with results is:   Procedure visit on 05/06/2025   Component Date Value    URINE HCG 05/06/2025 negative     Case Report 05/06/2025                      Value:Surgical Pathology Report                         Case: [id number]                                  Authorizing Provider:  Itzel Fairchild          Collected:           05/06/2025 1331                                     MD Antione                                                         Ordering Location:     [facility]  Received:            05/06/2025 1332                                     OBGY                                                                        Pathologist:           Christian Fairbanks MD                                                                 Specimen:    Endometrium, Endometrial Biopsy (EMB)                                                      Final Diagnosis 05/06/2025                      Value:A. Endometrium, Endometrial Biopsy (EMB):  - Markedly decidualized endometrium, consistent with exogenous hormone therapy.  - Separate small fragment of late secretory endometrium, may represent of a endometrial polyp.  - Negative for carcinoma.        Note 05/06/2025                      Value:Interpretation performed at [facility],  "[address] [facility] [facility] [address]        Additional Information 05/06/2025                      Value:All reported additional testing was performed with appropriately reactive controls.  These tests were developed and their performance characteristics determined by [facility] or appropriate performing facility, though some tests may be performed on tissues which have not been validated for performance characteristics (such as staining performed on alcohol exposed cell blocks and decalcified tissues).  Results should be interpreted with caution and in the context of the patients’ clinical condition. These tests may not be cleared or approved by the U.S. Food and Drug Administration, though the FDA has determined that such clearance or approval is not necessary. These tests are used for clinical purposes and they should not be regarded as investigational or for research. This laboratory has been approved by CLIA 88, designated as a high-complexity laboratory and is qualified to perform these tests.  .      Gross Description 05/06/2025                      Value:A. The specimen is received in formalin, labeled with the patient's name and hospital number, and is designated \" endometrial biopsy (EMB).\"  It consists of a 2.5 x 2.2 x 0.4 cm aggregate of tan to pink-red soft tissue fragments.  The specimen is submitted in toto in 2 cassettes, in embedding bags.    Note: The estimated total formalin fixation time based upon information provided by the submitting clinician and the standard processing schedule is under 72 hours.    MScheib      Clinical Information 05/06/2025                      Value:Endometrial Biopsy (EMB)      Assessment & Plan  Abnormal uterine bleeding (AUB)  We reviewed the AUB workup and treatment options:  Workup:   - Exam: Unremarkable; Likely small fibroid uterus given palpation  - Lab work: Not yet completed; patient encouraged to complete ASAP  - Pap: NILM, HPV neg " 7/2024  - EMB:  - Markedly decidualized endometrium, consistent with exogenous hormone therapy.  - Separate small fragment of late secretory endometrium, may represent of a endometrial polyp.  - Negative for carcinoma.  - Pelvic US 4/20/25 : 4cm subserosal fibroid; 2.8cm submucosal fibroid. EMS 14mm. Right ovary normal.   Treatment options:   Surgical management recommended due to finding of polyp on ultrasound.  Discussed D&C hysteroscopy due to polyp and offered to add endometrial ablation if possible at time of D&C - patient accepted.     Patient is most interested in D&C hysteroscopy with possible polypectomy with Prema endometrial ablation due to AUB. We reviewed the risks, benefits, and alternatives. She is aware of the risk particularly of cyclic pelvic pain (post-ablation syndrome) after ablation. We reviewed that that goal of an ablation is for an 80% reduction in bleeding though she may experience amenorrhea. Endometrial ablation is considered temporary and typically the effects last for about 5 years after which point, bleeding may return. It may be difficult to determine if bleeding >5 years from procedure is due to resumption of regular pre/perimenopausal menstrual cycles or postmenopausal bleeding. If endometrial ablation fails, patient is aware that the recommendation will be for hysterectomy. Informed consent was obtained for exam under anesthesia, D&C hysteroscopy, possible polypectomy prema endometrial ablation, and all other indicated procedures. Postoperative instructions and expectations were reviewed. She is aware of the scheduling process and will await call.              Itzel Lozano MD  5/22/2025  1:46 PM

## 2025-06-02 ENCOUNTER — TELEPHONE (OUTPATIENT)
Dept: OBGYN CLINIC | Facility: CLINIC | Age: 50
End: 2025-06-02

## 2025-06-02 NOTE — TELEPHONE ENCOUNTER
MD Shi Barba  [facility] OB GYN Department  Surgery Scheduling Sheet    Patient Name: Greg Mast  : 1975    Provider: Itzel Lozano MD     Needed: no; Language: Preferred language is Kyrgyz; sometimes patient asks for her  to receive phone calls and/or translate depending on her understanding    Procedure: EUA, D&C hysteroscopy, possible polypectomy, endometrial ablation, and AOIP    Diagnosis: AUB w/ polyp on US    Special Needs or Equipment: Symphion + Prema    Anesthesia: IV sedation with anesthesia    Length of stay: outpatient  Does patient have comorbid conditions that will require close perioperative monitoring prior to safe discharge: no    The patient has comorbid conditions that will require close perioperative monitoring prior to safe discharge, including N/A.  This may require acute care beyond the usual and routine recovery period. As such, inpatient admission post-operatively is expected and appropriate, and anticipated hospital length of stay will be >2 midnights.    Pre-Admission Testing Needed: no  Labs that should be ordered: NA    Order PAT that is recommended in prep for procedure?: No    Medical Clearance Needed: no; Provider: N/A    MA Form Signed (tubals/hysterectomy): Not Indicated    Surgical Drink Given: no    How many days out of work: up to 2 weeks  How many days no driving: Until able to do everything that driving requires and no longer taking narcotic medications    Is pre op appt needed?  no  Interval for post op appt: 2 week(s)      For Surgical Scheduler:    Surgery Scheduled On:  [place]: ANY    Pre-op Appt:  Post op Appt:  Consult/Medical clearance appt:

## 2025-06-04 DIAGNOSIS — F41.1 GAD (GENERALIZED ANXIETY DISORDER): ICD-10-CM

## 2025-06-04 DIAGNOSIS — N93.9 ABNORMAL UTERINE BLEEDING (AUB): ICD-10-CM

## 2025-06-05 RX ORDER — NORETHINDRONE 5 MG/1
5 TABLET ORAL DAILY
Qty: 60 TABLET | Refills: 0 | Status: SHIPPED | OUTPATIENT
Start: 2025-06-05

## 2025-06-05 RX ORDER — ESCITALOPRAM OXALATE 10 MG/1
TABLET ORAL
Qty: 30 TABLET | Refills: 5 | Status: SHIPPED | OUTPATIENT
Start: 2025-06-05

## 2025-06-16 ENCOUNTER — ANESTHESIA EVENT (OUTPATIENT)
Dept: ANESTHESIOLOGY | Facility: HOSPITAL | Age: 50
End: 2025-06-16

## 2025-06-16 ENCOUNTER — ANESTHESIA (OUTPATIENT)
Dept: ANESTHESIOLOGY | Facility: HOSPITAL | Age: 50
End: 2025-06-16

## 2025-06-26 NOTE — PRE-PROCEDURE INSTRUCTIONS
Pre-Surgery Instructions:   Medication Instructions    escitalopram (LEXAPRO) 10 mg tablet Take day of surgery.    norethindrone (Aygestin) 5 mg tablet Instructions provided by MD    Medication instructions for day of surgery reviewed. Please take all instructed medications with only a sip of water. Please do not take any over the counter (non-prescribed) vitamins or supplements for one week prior to date of surgery.      You will receive a call one business day prior to surgery with an arrival time and hospital directions. If your surgery is scheduled on a Monday, the hospital will be calling you on the Friday prior to your surgery. If you have not heard from anyone by 8pm, please call the hospital supervisor through the hospital  at [phone]. ([place] [phone] or [place] [phone]).    Do not eat or drink anything after midnight the night before your surgery, including candy, mints, lifesavers, or chewing gum. Do not drink alcohol 24hrs before your surgery. Try not to smoke at least 24hrs before your surgery.       Follow the pre surgery showering instructions as listed in the “My Surgical Experience Booklet” or otherwise provided by your surgeon's office. Do not use a blade to shave the surgical area 1 week before surgery. It is okay to use a clean electric clippers up to 24 hours before surgery. Do not apply any lotions, creams, including makeup, cologne, deodorant, or perfumes after showering on the day of your surgery. Do not use dry shampoo, hair spray, hair gel, or any type of hair products.     No contact lenses, eye make-up, or artificial eyelashes. Remove nail polish, including gel polish, and any artificial, gel, or acrylic nails if possible. Remove all jewelry including rings and body piercing jewelry.     Wear causal clothing that is easy to take on and off. Consider your type of surgery.    Keep any valuables, jewelry, piercings at home. Please bring any specially ordered  equipment (sling, braces) if indicated.    Arrange for a responsible person to drive you to and from the hospital on the day of your surgery. Please confirm the visitor policy for the day of your procedure when you receive your phone call with an arrival time.     Call the surgeon's office with any new illnesses, exposures, or additional questions prior to surgery.    Please reference your “My Surgical Experience Booklet” for additional information to prepare for your upcoming surgery.

## 2025-06-30 ENCOUNTER — ANESTHESIA EVENT (OUTPATIENT)
Dept: PERIOP | Facility: AMBULARY SURGERY CENTER | Age: 50
End: 2025-06-30
Payer: COMMERCIAL

## 2025-06-30 NOTE — ANESTHESIA PREPROCEDURE EVALUATION
Procedure:  HYSTEROSCOPY, DILATION AND CURETTAGE, POSSIBLE POLYPECTOMY, EXAM UNDER ANESTHESIA AND ALL OTHER INDICATED PROCEDURES (Uterus)  ENDOMETRIAL ABLATION (Uterus)    Echo 5/2023:    Left Ventricle: Left ventricular cavity size is normal. Wall thickness is normal. The left ventricular ejection fraction is 60%. Systolic function is normal. Wall motion is normal. Diastolic function is normal.    Right Ventricle: Right ventricular cavity size is normal. Systolic function is normal.    Tricuspid Valve: There is trace regurgitation. The right ventricular systolic pressure is normal. The estimated right ventricular systolic pressure is 23 mmHg.    EKG 4/2023:  Normal sinus rhythm/sinus bradycardia, 59 bpm, nonspecific ST-T wave changes in V1 to V4, no acute ischemic changes,       Relevant Problems   CARDIO   (+) Chest pain      NEURO/PSYCH   (+) Double vision   (+) PERLITA (generalized anxiety disorder)   (+) Nonintractable headache      Meds Reviewed    METS  Can walk 2 blocks    NPO?:  Appropriate    Physical Exam    Airway     Mallampati score: II    Neck ROM: full      Cardiovascular      Dental   Comment: Some small chips noted along upper incisors       Pulmonary      Neurological  - normal exam  She appears awake and alert.      Other Findings  post-pubertal.      Anesthesia Plan  ASA Score- 2     Anesthesia Type- general and IV sedation with anesthesia with ASA Monitors.         Additional Monitors:     Airway Plan: LMA and LMA.    Comment: Spouse assisted with translation to Czech  Plan for GA w/ LMA.       Plan Factors-Exercise tolerance (METS): >4 METS.    Chart reviewed. EKG reviewed. Imaging results reviewed.  Patient summary reviewed.                  Induction- intravenous.    Postoperative Plan- .   Monitoring Plan - Monitoring plan - standard ASA monitoring  Post Operative Pain Plan - multimodal analgesia        Informed Consent- Anesthetic plan and risks discussed with patient and spouse.  I  personally reviewed this patient with the CRNA. Discussed and agreed on the Anesthesia Plan with the CRNA..      NPO Status:  No vitals data found for the desired time range.

## 2025-07-01 ENCOUNTER — ANESTHESIA (OUTPATIENT)
Dept: PERIOP | Facility: AMBULARY SURGERY CENTER | Age: 50
End: 2025-07-01
Payer: COMMERCIAL

## 2025-07-01 ENCOUNTER — HOSPITAL ENCOUNTER (OUTPATIENT)
Facility: AMBULARY SURGERY CENTER | Age: 50
Setting detail: OUTPATIENT SURGERY
Discharge: HOME | End: 2025-07-01
Attending: OBSTETRICS & GYNECOLOGY | Admitting: OBSTETRICS & GYNECOLOGY
Payer: COMMERCIAL

## 2025-07-01 VITALS
SYSTOLIC BLOOD PRESSURE: 157 MMHG | BODY MASS INDEX: 22.82 KG/M2 | DIASTOLIC BLOOD PRESSURE: 77 MMHG | RESPIRATION RATE: 16 BRPM | WEIGHT: 142 LBS | HEIGHT: 66 IN | OXYGEN SATURATION: 98 % | TEMPERATURE: 97.6 F | HEART RATE: 65 BPM

## 2025-07-01 DIAGNOSIS — Z98.890 S/P ENDOMETRIAL ABLATION: ICD-10-CM

## 2025-07-01 DIAGNOSIS — N84.0 ENDOMETRIAL POLYP: ICD-10-CM

## 2025-07-01 DIAGNOSIS — N93.9 ABNORMAL UTERINE BLEEDING: ICD-10-CM

## 2025-07-01 DIAGNOSIS — Z98.890 STATUS POST D&C: Primary | ICD-10-CM

## 2025-07-01 LAB
EXT PREGNANCY TEST URINE: NEGATIVE
EXT. CONTROL: NORMAL

## 2025-07-01 PROCEDURE — 88305 TISSUE EXAM BY PATHOLOGIST: CPT | Performed by: PATHOLOGY

## 2025-07-01 PROCEDURE — 81025 URINE PREGNANCY TEST: CPT | Performed by: OBSTETRICS & GYNECOLOGY

## 2025-07-01 PROCEDURE — 58563 HYSTEROSCOPY ABLATION: CPT | Performed by: OBSTETRICS & GYNECOLOGY

## 2025-07-01 PROCEDURE — NC001 PR NO CHARGE: Performed by: OBSTETRICS & GYNECOLOGY

## 2025-07-01 RX ORDER — LIDOCAINE HYDROCHLORIDE 10 MG/ML
INJECTION, SOLUTION EPIDURAL; INFILTRATION; INTRACAUDAL; PERINEURAL AS NEEDED
Status: DISCONTINUED | OUTPATIENT
Start: 2025-07-01 | End: 2025-07-01

## 2025-07-01 RX ORDER — DEXAMETHASONE SODIUM PHOSPHATE 10 MG/ML
INJECTION, SOLUTION INTRAMUSCULAR; INTRAVENOUS AS NEEDED
Status: DISCONTINUED | OUTPATIENT
Start: 2025-07-01 | End: 2025-07-01

## 2025-07-01 RX ORDER — ONDANSETRON 2 MG/ML
INJECTION INTRAMUSCULAR; INTRAVENOUS AS NEEDED
Status: DISCONTINUED | OUTPATIENT
Start: 2025-07-01 | End: 2025-07-01

## 2025-07-01 RX ORDER — ACETAMINOPHEN 325 MG/1
975 TABLET ORAL ONCE
Status: DISCONTINUED | OUTPATIENT
Start: 2025-07-01 | End: 2025-07-01 | Stop reason: HOSPADM

## 2025-07-01 RX ORDER — FENTANYL CITRATE/PF 50 MCG/ML
25 SYRINGE (ML) INJECTION
Status: COMPLETED | OUTPATIENT
Start: 2025-07-01 | End: 2025-07-01

## 2025-07-01 RX ORDER — OXYCODONE HYDROCHLORIDE 5 MG/1
5 TABLET ORAL EVERY 4 HOURS PRN
Qty: 5 TABLET | Refills: 0 | Status: SHIPPED | OUTPATIENT
Start: 2025-07-01

## 2025-07-01 RX ORDER — PROPOFOL 10 MG/ML
INJECTION, EMULSION INTRAVENOUS AS NEEDED
Status: DISCONTINUED | OUTPATIENT
Start: 2025-07-01 | End: 2025-07-01

## 2025-07-01 RX ORDER — KETOROLAC TROMETHAMINE 30 MG/ML
INJECTION, SOLUTION INTRAMUSCULAR; INTRAVENOUS AS NEEDED
Status: DISCONTINUED | OUTPATIENT
Start: 2025-07-01 | End: 2025-07-01

## 2025-07-01 RX ORDER — HYDROMORPHONE HCL/PF 1 MG/ML
0.5 SYRINGE (ML) INJECTION ONCE
Status: COMPLETED | OUTPATIENT
Start: 2025-07-01 | End: 2025-07-01

## 2025-07-01 RX ORDER — ONDANSETRON 2 MG/ML
4 INJECTION INTRAMUSCULAR; INTRAVENOUS EVERY 6 HOURS PRN
Status: DISCONTINUED | OUTPATIENT
Start: 2025-07-01 | End: 2025-07-01 | Stop reason: HOSPADM

## 2025-07-01 RX ORDER — METOCLOPRAMIDE HYDROCHLORIDE 5 MG/ML
10 INJECTION INTRAMUSCULAR; INTRAVENOUS ONCE AS NEEDED
Status: DISCONTINUED | OUTPATIENT
Start: 2025-07-01 | End: 2025-07-01 | Stop reason: HOSPADM

## 2025-07-01 RX ORDER — MIDAZOLAM HYDROCHLORIDE 2 MG/2ML
INJECTION, SOLUTION INTRAMUSCULAR; INTRAVENOUS AS NEEDED
Status: DISCONTINUED | OUTPATIENT
Start: 2025-07-01 | End: 2025-07-01

## 2025-07-01 RX ORDER — OXYCODONE HYDROCHLORIDE 5 MG/1
5 TABLET ORAL ONCE AS NEEDED
Status: COMPLETED | OUTPATIENT
Start: 2025-07-01 | End: 2025-07-01

## 2025-07-01 RX ORDER — MAGNESIUM HYDROXIDE 1200 MG/15ML
LIQUID ORAL AS NEEDED
Status: DISCONTINUED | OUTPATIENT
Start: 2025-07-01 | End: 2025-07-01 | Stop reason: HOSPADM

## 2025-07-01 RX ORDER — ONDANSETRON 2 MG/ML
4 INJECTION INTRAMUSCULAR; INTRAVENOUS ONCE AS NEEDED
Status: DISCONTINUED | OUTPATIENT
Start: 2025-07-01 | End: 2025-07-01 | Stop reason: HOSPADM

## 2025-07-01 RX ORDER — SODIUM CHLORIDE, SODIUM LACTATE, POTASSIUM CHLORIDE, CALCIUM CHLORIDE 600; 310; 30; 20 MG/100ML; MG/100ML; MG/100ML; MG/100ML
20 INJECTION, SOLUTION INTRAVENOUS CONTINUOUS
Status: DISCONTINUED | OUTPATIENT
Start: 2025-07-01 | End: 2025-07-01

## 2025-07-01 RX ORDER — SODIUM CHLORIDE, SODIUM LACTATE, POTASSIUM CHLORIDE, CALCIUM CHLORIDE 600; 310; 30; 20 MG/100ML; MG/100ML; MG/100ML; MG/100ML
INJECTION, SOLUTION INTRAVENOUS CONTINUOUS PRN
Status: DISCONTINUED | OUTPATIENT
Start: 2025-07-01 | End: 2025-07-01

## 2025-07-01 RX ORDER — FENTANYL CITRATE 50 UG/ML
INJECTION, SOLUTION INTRAMUSCULAR; INTRAVENOUS AS NEEDED
Status: DISCONTINUED | OUTPATIENT
Start: 2025-07-01 | End: 2025-07-01

## 2025-07-01 RX ORDER — ACETAMINOPHEN 10 MG/ML
1000 INJECTION, SOLUTION INTRAVENOUS ONCE
Status: COMPLETED | OUTPATIENT
Start: 2025-07-01 | End: 2025-07-01

## 2025-07-01 RX ORDER — IBUPROFEN 600 MG/1
600 TABLET, FILM COATED ORAL EVERY 6 HOURS SCHEDULED
Qty: 30 TABLET | Refills: 0 | Status: SHIPPED | OUTPATIENT
Start: 2025-07-01

## 2025-07-01 RX ADMIN — FENTANYL CITRATE 25 MCG: 50 INJECTION INTRAMUSCULAR; INTRAVENOUS at 11:19

## 2025-07-01 RX ADMIN — ACETAMINOPHEN 1000 MG: 10 INJECTION, SOLUTION INTRAVENOUS at 11:16

## 2025-07-01 RX ADMIN — PROPOFOL 170 MG: 10 INJECTION, EMULSION INTRAVENOUS at 10:00

## 2025-07-01 RX ADMIN — SODIUM CHLORIDE, SODIUM LACTATE, POTASSIUM CHLORIDE, AND CALCIUM CHLORIDE: .6; .31; .03; .02 INJECTION, SOLUTION INTRAVENOUS at 09:56

## 2025-07-01 RX ADMIN — FENTANYL CITRATE 25 MCG: 50 INJECTION INTRAMUSCULAR; INTRAVENOUS at 11:14

## 2025-07-01 RX ADMIN — FENTANYL CITRATE 50 MCG: 50 INJECTION INTRAMUSCULAR; INTRAVENOUS at 10:00

## 2025-07-01 RX ADMIN — ONDANSETRON 4 MG: 2 INJECTION INTRAMUSCULAR; INTRAVENOUS at 10:18

## 2025-07-01 RX ADMIN — DEXAMETHASONE SODIUM PHOSPHATE 5 MG: 10 INJECTION INTRAMUSCULAR; INTRAVENOUS at 10:00

## 2025-07-01 RX ADMIN — FENTANYL CITRATE 25 MCG: 50 INJECTION INTRAMUSCULAR; INTRAVENOUS at 11:27

## 2025-07-01 RX ADMIN — HYDROMORPHONE HYDROCHLORIDE 0.5 MG: 1 INJECTION, SOLUTION INTRAMUSCULAR; INTRAVENOUS; SUBCUTANEOUS at 13:24

## 2025-07-01 RX ADMIN — FENTANYL CITRATE 50 MCG: 50 INJECTION INTRAMUSCULAR; INTRAVENOUS at 10:18

## 2025-07-01 RX ADMIN — LIDOCAINE HYDROCHLORIDE 50 MG: 10 INJECTION, SOLUTION EPIDURAL; INFILTRATION; INTRACAUDAL at 10:00

## 2025-07-01 RX ADMIN — KETOROLAC TROMETHAMINE 15 MG: 30 INJECTION, SOLUTION INTRAMUSCULAR; INTRAVENOUS at 10:31

## 2025-07-01 RX ADMIN — MIDAZOLAM 2 MG: 1 INJECTION INTRAMUSCULAR; INTRAVENOUS at 09:56

## 2025-07-01 RX ADMIN — FENTANYL CITRATE 25 MCG: 50 INJECTION INTRAMUSCULAR; INTRAVENOUS at 10:52

## 2025-07-01 RX ADMIN — OXYCODONE 5 MG: 5 TABLET ORAL at 12:10

## 2025-07-01 NOTE — H&P
"H&P Exam - Gynecology   Greg Mast 50 y.o. female MRN: [id number]  Unit/Bed#: OR POOL Encounter: [id number]      History of Present Illness     HPI:  Greg Mast is a 50 y.o. female who presents for scheduled EUA, D&C hysteroscopy, polypectomy and ablation due to AUB. She had an EMB on 25 which showed possible polyp. She has been taking Aygestin with resolution of her bleeding. Bleeding when she presented 2024 was every other week and lasting for 7 days with 3-4 days of heavy bleeding and clots. Her bleeding did resolve after her biopsy.     She reports that her mood has been labile and she would like to start medications to help with regulation. We previously discussed that this would be further addressed after her procedure.     Historical Information   Past Medical History[1]  Past Surgical History[2]  OB History    Para Term  AB Living   2 2 2   2   SAB IAB Ectopic Multiple Live Births       2      # Outcome Date GA Lbr Kaleb/2nd Weight Sex Type Anes PTL Lv   2 Term     M CS-Unspec   JOSIAH   1 Term     M CS-Unspec   JOSIAH     Family History[3]  Social History   Social History     Substance and Sexual Activity   Alcohol Use Never     Social History     Substance and Sexual Activity   Drug Use Never     Tobacco Use History[4]    Meds/Allergies     Medications Prior to Admission:     escitalopram (LEXAPRO) 10 mg tablet    amoxicillin-clavulanate (AUGMENTIN) 875-125 mg per tablet    azithromycin (ZITHROMAX) 250 mg tablet    norethindrone (Aygestin) 5 mg tablet    pantoprazole (PROTONIX) 40 mg tablet    tobramycin (TOBREX) 0.3 % SOLN    Tranexamic Acid 650 MG TABS  Allergies[5]    Objective   /68   Pulse 68   Temp (!) 97.4 °F (36.3 °C) (Temporal)   Resp 18   Ht 5' 6\" (1.676 m)   Wt 64.4 kg (142 lb)   SpO2 98%   BMI 22.92 kg/m²     No intake or output data in the 24 hours ending 25 0944    Physical Exam  Vitals reviewed.   Constitutional:       General: She is not in acute " distress.     Appearance: Normal appearance. She is well-developed. She is not ill-appearing, toxic-appearing or diaphoretic.     Cardiovascular:      Rate and Rhythm: Normal rate.   Pulmonary:      Effort: Pulmonary effort is normal. No respiratory distress.   Genitourinary:     Comments: BL LE present    Skin:     General: Skin is warm and dry.     Neurological:      Mental Status: She is alert and oriented to person, place, and time.     Psychiatric:         Mood and Affect: Mood normal.         Behavior: Behavior normal.       Pelvic US 4/20/25:   PELVIC ULTRASOUND, COMPLETE     INDICATION: The patient is 50 years old. N93.9: Abnormal uterine and vaginal bleeding, unspecified.     COMPARISON: None     TECHNIQUE: Transabdominal pelvic ultrasound was performed in sagittal and transverse planes with a curvilinear transducer. Additional transvaginal imaging was performed to better evaluate the endometrium and ovaries. Imaging included volumetric sweeps as   well as traditional still imaging technique.     FINDINGS:     UTERUS:  The uterus is anteverted in position, measuring 9.8 x 5.0 x 7.2 cm.  Uterine fibroids, with a 2.4 x 2.3 x 2.8 cm partially submucosal anterior fundal myoma. Additional left subserosal myoma measures 3.9 x 3.4 x 3.3 cm.  The cervix appears within normal limits.     ENDOMETRIUM:  The endometrial echo complex has an AP caliber of 14.0 mm.  Appearance within normal limits.     OVARIES/ADNEXA:  Right ovary: 2.5 x 2.1 x 1.2 cm. 3.3 mL.  Ovarian Doppler flow is within normal limits.  No suspicious ovarian or adnexal abnormality.     Left ovary: Left ovary is surgically absent.     OTHER:  No free fluid or loculated fluid collections.     IMPRESSION:     Fibroid uterus with a partially submucosal anterior fundal myoma.     Status post left oophorectomy.    Lab Results:   Admission on 07/01/2025   Component Date Value    EXT Preg Test, Ur 07/01/2025 Negative     Control 07/01/2025 Valid          Assessment & Plan     A/P: Greg is a 51yo w/ AUB who is scheduled for EUA, D&C hysteroscopy, possible polypectomy, endometrial ablation and all other indicated procedures.     We reviewed the AUB workup and treatment options:  Workup:   - Exam: Unremarkable; Likely small fibroid uterus given palpation  - Lab work: Not yet completed; patient encouraged to complete ASAP  - Pap: NILM, HPV neg 7/2024  - EMB:  - Markedly decidualized endometrium, consistent with exogenous hormone therapy.  - Separate small fragment of late secretory endometrium, may represent of a endometrial polyp.  - Negative for carcinoma.  - Pelvic US 4/20/25 : 4cm subserosal fibroid; 2.8cm submucosal fibroid. EMS 14mm. Right ovary normal.   Treatment options:   Surgical management recommended due to finding of polyp on ultrasound.  Discussed D&C hysteroscopy due to polyp and offered to add endometrial ablation if possible at time of D&C - patient accepted.      Patient is scheduled D&C hysteroscopy with possible polypectomy with Prema endometrial ablation due to AUB. We reviewed the risks, benefits, and alternatives. She is aware of the risk particularly of cyclic pelvic pain (post-ablation syndrome) after ablation. We reviewed that that goal of an ablation is for an 80% reduction in bleeding though she may experience amenorrhea. Endometrial ablation is considered temporary and typically the effects last for about 5 years after which point, bleeding may return. It may be difficult to determine if bleeding >5 years from procedure is due to resumption of regular pre/perimenopausal menstrual cycles or postmenopausal bleeding. If endometrial ablation fails, patient is aware that the recommendation will be for hysterectomy. Informed consent was obtained for exam under anesthesia, D&C hysteroscopy, possible polypectomy prema endometrial ablation, and all other indicated procedures. Postoperative instructions and expectations were reviewed. All  questions were answered to patient's satisfaction. Will proceed to the OR as scheduled.       Itzel Talbot MD  OB/GYN  She/her  7/1/2025  9:44 AM           [1]   Past Medical History:  Diagnosis Date    Headache(784.0)     Visual impairment    [2]   Past Surgical History:  Procedure Laterality Date    LEFT OOPHORECTOMY Left    [3]   Family History  Problem Relation Name Age of Onset    No Known Problems Mother      Diabetes Father [place]     Kidney cancer Maternal Grandmother      No Known Problems Maternal Grandfather      No Known Problems Paternal Grandmother      No Known Problems Paternal Grandfather      No Known Problems Paternal Aunt      No Known Problems Paternal Aunt      No Known Problems Paternal Aunt     [4]   Social History  Tobacco Use   Smoking Status Former    Current packs/day: 0.25    Types: Cigarettes   Smokeless Tobacco Never   [5]   Allergies  Allergen Reactions    Fish Oil - Food Allergy Cough     Salt water fish

## 2025-07-01 NOTE — ANESTHESIA POSTPROCEDURE EVALUATION
Post-Op Assessment Note    CV Status:  Stable    Pain management: adequate       Mental Status:  Alert and awake   Hydration Status:  Euvolemic   PONV Controlled:  Controlled   Airway Patency:  Patent     Post Op Vitals Reviewed: Yes    No anethesia notable event occurred.    Staff: Anesthesiologist, CRNA           Last Filed PACU Vitals:  Vitals Value Taken Time   Temp 97.6 °F (36.4 °C) 07/01/25 11:43   Pulse 51 07/01/25 11:46   /76 07/01/25 11:43   Resp 17 07/01/25 11:46   SpO2 99 % 07/01/25 11:46   Vitals shown include unfiled device data.    Modified Sara:     Vitals Value Taken Time   Activity 2 07/01/25 11:43   Respiration 2 07/01/25 11:43   Circulation 2 07/01/25 11:43   Consciousness 2 07/01/25 11:43   Oxygen Saturation 2 07/01/25 11:43     Modified Sara Score: 10

## 2025-07-01 NOTE — DISCHARGE INSTR - AVS FIRST PAGE
Alvin Garza,    Your surgery went well! We were able to look inside your uterus and remove any extra tissue as planned. We were also able to ablate (burn) the inside of the uterus.     For pain:   Tylenol: Up to 1000mg (975mg = 3 tablets) every 6 hours  Motrin: Up to 600mg (1 tablet prescription or 3 tablets of the regular strength 200mg ibuprofen) every 6 hours  Roxicodone: Up to 5mg every 4 hours as needed for severe pain.    I recommend alternating Tylenol and Motrin every 3 hours (Tylenol dosing then 3 hours later Motrin dosing then 3 hours later Tylenol dosing then 3 hours later Motrin dosing, etc.). I recommend using the Roxicodone only when needed.     Please call the office with any concerns especially if you have any chest pain, shortness of breath, fevers, chills, or concerns about your incisions or bleeding. You will have some bleeding and discharge which is normal! Please do not place anything inside the vagina for the next week and please avoid submersion in water for the next 3 days. We will see you in the office in 2 weeks.    Take care,   - Dr. Talbot

## 2025-07-01 NOTE — OP NOTE
OPERATIVE REPORT  PATIENT NAME: Greg Mast    :  1975  MRN: [id number]  Pt Location: [facility] OR ROOM 01    SURGERY DATE: 2025    Surgeons and Role:     * Itzel Soto MD - Primary     * Solomon Fink MD - Assisting    Preop Diagnosis:  Abnormal uterine bleeding [N93.9]  Endometrial polyp [N84.0]    Post-Op Diagnosis Codes:     * Abnormal uterine bleeding [N93.9]    Procedure(s):  EXAM UNDER ANESTHESIA  HYSTEROSCOPY  DILATION AND CURETTAGE  ENDOMETRIAL ABLATION    Specimen(s):  ID Type Source Tests Collected by Time Destination   1 : Endometrial Curettings Tissue Endometrium TISSUE EXAM Itzel Soto MD 2025 1002      Estimated Blood Loss:   2cc    Anesthesia Type:   IV Sedation with Anesthesia, LMA    Operative Indications:  Abnormal uterine bleeding [N93.9]  Endometrial polyp [N84.0]    Operative Findings:  Normal appearing external female genitalia  On bimanual exam, uterus anteverted, normal in size and mobile, small subserosal fibroid, no obviously palpable adnexal masses   Normal appearing vagina  Normal appearing cervix  On hysteroscopy:  - normal appearing endometrium throughout without obvious pathology  - normal appearing right and left tubal ostia     Complications:   None apparent    Procedure and Technique:  Patient was taken to the operating room. General LMA anesthesia (LMA) was administered and the patient was positioned on the OR table in the dorsal lithotomy position. All pressure points were padded and warm blankets were placed to maintain control of core body temperature. A bimanual exam was performed and the uterus was noted to be anteverted, normal in size and consistency with a small subserosal fibroid. The patient was prepped and draped in the usual sterile fashion.    A time out was performed to confirm correct patient and correct procedure. A Brit speculum was inserted into the vagina and a Olivier retractor was used to visualize the anterior lip of the  cervix, which was then grasped with a single toothed tenaculum. The uterus was sounded to 10cm. The cervix was serially dilated using Mendoza dilators to 21 [place] for introduction of the hysteroscope.     The hysteroscope was introduced under direct visualization using normal saline solution as the distention media. The hysteroscope was advanced to the uterine fundus and the entire uterine cavity was inspected in a systematic manner. There was noted to be normal appearing endometrium throughout without obvious pathology.     The hysteroscope was withdrawn and a small curette was inserted to perform sharp curetting, starting at the 12 o clock position and rotating a total of 360 degrees to cover all surfaces. Scant endometrial tissue was obtained and sent for pathology.     The Prema endometrial ablation device was then inserted into the endometrial cavity. The device was deployed and the cervical balloon was inflated. Safety checks were performed and the device was deployed. A full 2 minutes cycle was completed. The Prema device was then removed from the endometrial cavity. The device was inspected and ablated endometrial tissue was noted.    The single toothed tenaculum was removed from the anterior lip of the cervix. Good hemostasis was confirmed at the tenaculum sites. All instrumentation was then removed from the vagina. Fluid deficit was noted to be 520cc.    At the conclusion of the procedure, all needle, sponge, and instrument counts were noted to be correct x2. The patient was transferred to the PACU in stable condition.    Attending physician Dr. Soto was present for and participated in all key portions of the case.    Patient Disposition:  PACU     SIGNATURE: Solomon Fink MD  DATE: July 1, 2025  TIME: 10:40 AM

## 2025-07-01 NOTE — ANESTHESIA POSTPROCEDURE EVALUATION
Post-Op Assessment Note    CV Status:  Stable  Pain Score: 0    Pain management: adequate       Mental Status:  Sleepy   Hydration Status:  Euvolemic and stable   PONV Controlled:  Controlled   Airway Patency:  Patent     Post Op Vitals Reviewed: Yes    No anethesia notable event occurred.    Staff: CRNA           Last Filed PACU Vitals:  Vitals Value Taken Time   Temp 97.3 °F (36.3 °C) 07/01/25 10:44   Pulse 52 07/01/25 10:44   /84 07/01/25 10:44   Resp 31 07/01/25 10:44   SpO2 100 % 07/01/25 10:44   Vitals shown include unfiled device data.

## 2025-07-01 NOTE — QUICK NOTE
Patient seen at bedside secondary to 9/10 cramping abdominal/ vaginal pain. She reports her pain is now a 6/10. Patient describes her pain as being like period cramps. Also endorses some vaginal irritation likely secondary to the vaginal prep used.   On exam, her belly is soft and she overall appears well.     Patient desires discharge home. She and her  are asking if she can have 1-2 zander sent to the pharmacy in case the pain recurs. We discussed that the best medication for this type of pain is motrin, but we will send her a few roxicodone in case her pain recurs.     Discussed with Dr. Soto.     Magui Louis MD  OBGYN PGY-3  7/1/2025 1:53 PM

## 2025-07-07 PROCEDURE — 88305 TISSUE EXAM BY PATHOLOGIST: CPT | Performed by: PATHOLOGY

## 2025-07-18 ENCOUNTER — OFFICE VISIT (OUTPATIENT)
Age: 50
End: 2025-07-18

## 2025-07-18 VITALS
HEIGHT: 66 IN | BODY MASS INDEX: 23.46 KG/M2 | WEIGHT: 146 LBS | DIASTOLIC BLOOD PRESSURE: 78 MMHG | SYSTOLIC BLOOD PRESSURE: 118 MMHG

## 2025-07-18 DIAGNOSIS — Z98.890 S/P ENDOMETRIAL ABLATION: Primary | ICD-10-CM

## 2025-07-18 PROCEDURE — 99024 POSTOP FOLLOW-UP VISIT: CPT | Performed by: OBSTETRICS & GYNECOLOGY

## 2025-07-18 NOTE — PROGRESS NOTES
"Subjective:     Greg Mast is a 50 y.o.  female who presents for scheduled postoperative follow up after EUA, D&C hysteroscopy, and endometrial ablation on 25 due to AUB and endometrial polyp on ultrasound. She reports very minimal pelvic pain/cramping.  She reports some pink vaginal bleeding.  She denies chest pain, shortness of breath or issues with urination or defecation.      Problem List[1]  Past Medical History[2]  Past Surgical History[3]    Objective:    Vitals: Blood pressure 118/78, height 5' 6\" (1.676 m), weight 66.2 kg (146 lb).Body mass index is 23.57 kg/m².    Physical Exam  Vitals reviewed.   Constitutional:       General: She is not in acute distress.     Appearance: Normal appearance. She is well-developed. She is not ill-appearing, toxic-appearing or diaphoretic.     Cardiovascular:      Rate and Rhythm: Normal rate.   Pulmonary:      Effort: Pulmonary effort is normal. No respiratory distress.     Skin:     General: Skin is warm and dry.     Neurological:      Mental Status: She is alert and oriented to person, place, and time.     Psychiatric:         Mood and Affect: Mood normal.         Behavior: Behavior normal.         Assessment & Plan  S/P endometrial ablation  - Operative findings reviewed (images reviewed)  - Pathology results reviewed as follows:    -Benign inactive endometrium with focal breakdown change.  -Benign endocervical mucosa.   - Pain management: OTC  - Activity restrictions: None  -We reviewed the hope that her bleeding will resolve with time.  We reviewed the hope that she will remain amenorrheic status post this procedure but if her bleeding were to return and that it will continue to be minimal.  - RTO for annual (due 2025) and PRN           Itzel Lozano MD  2025  9:47 AM               [1]   Patient Active Problem List  Diagnosis    Upper abdominal pain    Double vision    Nonintractable headache    Chest pain    PERLITA (generalized anxiety " disorder)    Abnormal LFTs    Cervical radiculopathy at C7    Vaginal itching    S/P endometrial ablation    Status post D&C   [2]   Past Medical History:  Diagnosis Date    Headache(784.0)     Visual impairment    [3]   Past Surgical History:  Procedure Laterality Date    ENDOMETRIAL ABLATION N/A 7/1/2025    Procedure: ENDOMETRIAL ABLATION;  Surgeon: Itzel Soto MD;  Location: [facility] MAIN OR;  Service: Gynecology    LEFT OOPHORECTOMY Left     AR HYSTEROSCOPY BX ENDOMETRIUM&/POLYPC W/WO D&C N/A 7/1/2025    Procedure: HYSTEROSCOPY, DILATION AND CURETTAGE, EXAM UNDER ANESTHESIA;  Surgeon: Itzel Soto MD;  Location: [facility];  Service: Gynecology

## 2025-07-18 NOTE — ASSESSMENT & PLAN NOTE
- Operative findings reviewed (images reviewed)  - Pathology results reviewed as follows:    -Benign inactive endometrium with focal breakdown change.  -Benign endocervical mucosa.   - Pain management: OTC  - Activity restrictions: None  -We reviewed the hope that her bleeding will resolve with time.  We reviewed the hope that she will remain amenorrheic status post this procedure but if her bleeding were to return and that it will continue to be minimal.  - RTO for annual (due 7/2025) and PRN

## 2025-08-21 ENCOUNTER — OFFICE VISIT (OUTPATIENT)
Dept: FAMILY MEDICINE CLINIC | Facility: CLINIC | Age: 50
End: 2025-08-21
Payer: COMMERCIAL

## 2025-08-21 VITALS
BODY MASS INDEX: 23.88 KG/M2 | SYSTOLIC BLOOD PRESSURE: 110 MMHG | HEIGHT: 66 IN | RESPIRATION RATE: 16 BRPM | HEART RATE: 58 BPM | WEIGHT: 148.6 LBS | TEMPERATURE: 98.4 F | DIASTOLIC BLOOD PRESSURE: 72 MMHG | OXYGEN SATURATION: 99 %

## 2025-08-21 DIAGNOSIS — G43.709 CHRONIC MIGRAINE WITHOUT AURA WITHOUT STATUS MIGRAINOSUS, NOT INTRACTABLE: Primary | ICD-10-CM

## 2025-08-21 DIAGNOSIS — F41.1 GAD (GENERALIZED ANXIETY DISORDER): ICD-10-CM

## 2025-08-21 DIAGNOSIS — Z13.220 ENCOUNTER FOR SCREENING FOR LIPID DISORDER: ICD-10-CM

## 2025-08-21 DIAGNOSIS — R53.83 OTHER FATIGUE: ICD-10-CM

## 2025-08-21 PROCEDURE — 99214 OFFICE O/P EST MOD 30 MIN: CPT | Performed by: FAMILY MEDICINE

## 2025-08-21 RX ORDER — RIZATRIPTAN BENZOATE 10 MG/1
10 TABLET ORAL ONCE AS NEEDED
Qty: 10 TABLET | Refills: 5 | Status: SHIPPED | OUTPATIENT
Start: 2025-08-21

## 2025-08-21 RX ORDER — NORTRIPTYLINE HYDROCHLORIDE 10 MG/1
CAPSULE ORAL
Qty: 60 CAPSULE | Refills: 1 | Status: SHIPPED | OUTPATIENT
Start: 2025-08-21

## 2025-08-23 DIAGNOSIS — R79.0 LOW FERRITIN LEVEL: Primary | ICD-10-CM

## 2025-08-23 PROBLEM — R53.83 OTHER FATIGUE: Status: ACTIVE | Noted: 2025-08-23

## 2025-08-23 RX ORDER — FERROUS SULFATE 324(65)MG
324 TABLET, DELAYED RELEASE (ENTERIC COATED) ORAL DAILY
Qty: 90 TABLET | Refills: 1 | Status: SHIPPED | OUTPATIENT
Start: 2025-08-23

## (undated) DEVICE — Device

## (undated) DEVICE — TISSUE REMOVAL SYSTEM FLUID MANAGEMENT ACCESSORIES: Brand: SYMPHION

## (undated) DEVICE — BETHLEHEM UNIVERSAL MINOR VAG: Brand: CARDINAL HEALTH

## (undated) DEVICE — UNDER BUTTOCKS DRAPE W/FLUID CONTROL POUCH: Brand: CONVERTORS

## (undated) DEVICE — DOVER URETHRAL PVC CATHETER, INTEGRAL FUNNEL, SMOOTH ROUNDED TIP, 14 FR (4.7 MM) X 16": Brand: DOVER

## (undated) DEVICE — CHLORHEXIDINE 4PCT 4 OZ

## (undated) DEVICE — STERILE MINERVA DISPOSABLE HANDPIECECONTENTS:(1) ONE SINGLE USE STERILE MINERVA ES DISPOSABLE HANDPIECE (1) ONE SINGLE USE STERILE SYRINGE(1) ONE SINGLE USE STERILE 8MM HEGAR DILATOR(1) ONE SINGLE USE NON-STERILE DESICCANT(1) ONE NON-STERILE HANDPIECE INSTRUCTIONS FOR USE(1)  ONE NON-STERILE DILATOR INSTRUCTIONS FOR USE: Brand: MINERVA SINGLE STERILE DISPOSABLE HANDPIECE

## (undated) DEVICE — MAYO STAND COVER: Brand: CONVERTORS

## (undated) DEVICE — SKN PRP WNG SPNGE PVP SCRB STR: Brand: MEDLINE INDUSTRIES, INC.